# Patient Record
Sex: FEMALE | Race: WHITE | NOT HISPANIC OR LATINO | Employment: UNEMPLOYED | URBAN - METROPOLITAN AREA
[De-identification: names, ages, dates, MRNs, and addresses within clinical notes are randomized per-mention and may not be internally consistent; named-entity substitution may affect disease eponyms.]

---

## 2021-08-19 ENCOUNTER — TELEPHONE (OUTPATIENT)
Dept: OBSTETRICS AND GYNECOLOGY | Facility: CLINIC | Age: 61
End: 2021-08-19

## 2021-11-17 ENCOUNTER — LAB VISIT (OUTPATIENT)
Dept: LAB | Facility: HOSPITAL | Age: 61
End: 2021-11-17
Attending: NURSE PRACTITIONER
Payer: MEDICAID

## 2021-11-17 ENCOUNTER — OFFICE VISIT (OUTPATIENT)
Dept: DIABETES | Facility: CLINIC | Age: 61
End: 2021-11-17
Payer: MEDICAID

## 2021-11-17 VITALS
HEIGHT: 67 IN | WEIGHT: 186.75 LBS | BODY MASS INDEX: 29.31 KG/M2 | DIASTOLIC BLOOD PRESSURE: 79 MMHG | HEART RATE: 79 BPM | SYSTOLIC BLOOD PRESSURE: 146 MMHG

## 2021-11-17 DIAGNOSIS — E03.8 HYPOTHYROIDISM DUE TO HASHIMOTO'S THYROIDITIS: ICD-10-CM

## 2021-11-17 DIAGNOSIS — E11.649 CONTROLLED TYPE 2 DIABETES MELLITUS WITH HYPOGLYCEMIA, WITH LONG-TERM CURRENT USE OF INSULIN: Primary | ICD-10-CM

## 2021-11-17 DIAGNOSIS — E11.42 DIABETIC PERIPHERAL NEUROPATHY ASSOCIATED WITH TYPE 2 DIABETES MELLITUS: ICD-10-CM

## 2021-11-17 DIAGNOSIS — Z79.4 CONTROLLED TYPE 2 DIABETES MELLITUS WITH HYPOGLYCEMIA, WITH LONG-TERM CURRENT USE OF INSULIN: ICD-10-CM

## 2021-11-17 DIAGNOSIS — E06.3 HYPOTHYROIDISM DUE TO HASHIMOTO'S THYROIDITIS: ICD-10-CM

## 2021-11-17 DIAGNOSIS — Z79.4 CONTROLLED TYPE 2 DIABETES MELLITUS WITH HYPOGLYCEMIA, WITH LONG-TERM CURRENT USE OF INSULIN: Primary | ICD-10-CM

## 2021-11-17 DIAGNOSIS — E11.649 CONTROLLED TYPE 2 DIABETES MELLITUS WITH HYPOGLYCEMIA, WITH LONG-TERM CURRENT USE OF INSULIN: ICD-10-CM

## 2021-11-17 LAB
ANION GAP SERPL CALC-SCNC: 8 MMOL/L (ref 8–16)
BUN SERPL-MCNC: 10 MG/DL (ref 8–23)
C PEPTIDE SERPL-MCNC: 0.37 NG/ML (ref 0.78–5.19)
CALCIUM SERPL-MCNC: 9.4 MG/DL (ref 8.7–10.5)
CHLORIDE SERPL-SCNC: 104 MMOL/L (ref 95–110)
CO2 SERPL-SCNC: 28 MMOL/L (ref 23–29)
CREAT SERPL-MCNC: 0.7 MG/DL (ref 0.5–1.4)
EST. GFR  (AFRICAN AMERICAN): >60 ML/MIN/1.73 M^2
EST. GFR  (NON AFRICAN AMERICAN): >60 ML/MIN/1.73 M^2
GLUCOSE SERPL-MCNC: 74 MG/DL (ref 70–110)
GLUCOSE SERPL-MCNC: 81 MG/DL (ref 70–110)
POTASSIUM SERPL-SCNC: 4.1 MMOL/L (ref 3.5–5.1)
SODIUM SERPL-SCNC: 140 MMOL/L (ref 136–145)

## 2021-11-17 PROCEDURE — 82962 GLUCOSE BLOOD TEST: CPT | Mod: PBBFAC | Performed by: NURSE PRACTITIONER

## 2021-11-17 PROCEDURE — 86341 ISLET CELL ANTIBODY: CPT | Performed by: NURSE PRACTITIONER

## 2021-11-17 PROCEDURE — 86337 INSULIN ANTIBODIES: CPT | Performed by: NURSE PRACTITIONER

## 2021-11-17 PROCEDURE — 99999 PR PBB SHADOW E&M-EST. PATIENT-LVL V: CPT | Mod: PBBFAC,,, | Performed by: NURSE PRACTITIONER

## 2021-11-17 PROCEDURE — 99999 PR PBB SHADOW E&M-EST. PATIENT-LVL V: ICD-10-PCS | Mod: PBBFAC,,, | Performed by: NURSE PRACTITIONER

## 2021-11-17 PROCEDURE — 99214 PR OFFICE/OUTPT VISIT, EST, LEVL IV, 30-39 MIN: ICD-10-PCS | Mod: S$PBB,,, | Performed by: NURSE PRACTITIONER

## 2021-11-17 PROCEDURE — 99215 OFFICE O/P EST HI 40 MIN: CPT | Mod: PBBFAC | Performed by: NURSE PRACTITIONER

## 2021-11-17 PROCEDURE — 99214 OFFICE O/P EST MOD 30 MIN: CPT | Mod: S$PBB,,, | Performed by: NURSE PRACTITIONER

## 2021-11-17 PROCEDURE — 84681 ASSAY OF C-PEPTIDE: CPT | Performed by: NURSE PRACTITIONER

## 2021-11-17 PROCEDURE — 36415 COLL VENOUS BLD VENIPUNCTURE: CPT | Performed by: NURSE PRACTITIONER

## 2021-11-17 PROCEDURE — 80048 BASIC METABOLIC PNL TOTAL CA: CPT | Performed by: NURSE PRACTITIONER

## 2021-11-17 RX ORDER — VIT C/E/ZN/COPPR/LUTEIN/ZEAXAN 250MG-90MG
2000 CAPSULE ORAL
COMMUNITY

## 2021-11-17 RX ORDER — PRASUGREL 10 MG/1
TABLET, FILM COATED ORAL
COMMUNITY

## 2021-11-17 RX ORDER — DIAZEPAM 5 MG/1
TABLET ORAL
COMMUNITY
Start: 2021-08-23

## 2021-11-17 RX ORDER — METFORMIN HYDROCHLORIDE 1000 MG/1
1 TABLET ORAL 2 TIMES DAILY
COMMUNITY
Start: 2021-01-25 | End: 2021-11-17 | Stop reason: ALTCHOICE

## 2021-11-17 RX ORDER — ISOSORBIDE MONONITRATE 30 MG/1
30 TABLET, EXTENDED RELEASE ORAL DAILY
Status: ON HOLD | COMMUNITY
Start: 2021-10-11 | End: 2023-08-23 | Stop reason: HOSPADM

## 2021-11-17 RX ORDER — DULAGLUTIDE 1.5 MG/.5ML
INJECTION, SOLUTION SUBCUTANEOUS
COMMUNITY
End: 2021-11-17 | Stop reason: ALTCHOICE

## 2021-11-17 RX ORDER — ROSUVASTATIN CALCIUM 10 MG/1
TABLET, COATED ORAL
COMMUNITY
End: 2022-01-24 | Stop reason: DRUGHIGH

## 2021-11-17 RX ORDER — TRAMADOL HYDROCHLORIDE 50 MG/1
50 TABLET ORAL 2 TIMES DAILY PRN
COMMUNITY
Start: 2021-09-10

## 2021-11-17 RX ORDER — INSULIN GLARGINE 100 [IU]/ML
48 INJECTION, SOLUTION SUBCUTANEOUS DAILY
Qty: 15 ML | Refills: 5 | Status: SHIPPED | OUTPATIENT
Start: 2021-11-17 | End: 2022-08-19 | Stop reason: SDUPTHER

## 2021-11-17 RX ORDER — ASPIRIN 81 MG/1
TABLET ORAL
COMMUNITY

## 2021-11-17 RX ORDER — OMEPRAZOLE 20 MG/1
CAPSULE, DELAYED RELEASE ORAL
COMMUNITY
Start: 2021-10-11

## 2021-11-17 RX ORDER — ESTRADIOL 2 MG/1
TABLET ORAL
COMMUNITY
End: 2022-01-24 | Stop reason: DRUGHIGH

## 2021-11-17 RX ORDER — EMPAGLIFLOZIN 25 MG/1
25 TABLET, FILM COATED ORAL DAILY
Qty: 30 TABLET | Refills: 5 | Status: SHIPPED | OUTPATIENT
Start: 2021-11-17 | End: 2021-11-26 | Stop reason: CLARIF

## 2021-11-17 RX ORDER — PREGABALIN 50 MG/1
50 CAPSULE ORAL 3 TIMES DAILY
COMMUNITY
Start: 2021-10-28 | End: 2022-01-24 | Stop reason: DRUGHIGH

## 2021-11-17 RX ORDER — INSULIN DEGLUDEC 200 U/ML
INJECTION, SOLUTION SUBCUTANEOUS
COMMUNITY
Start: 2021-09-07 | End: 2021-11-17 | Stop reason: CLARIF

## 2021-11-17 RX ORDER — RANOLAZINE 500 MG/1
TABLET, EXTENDED RELEASE ORAL
Status: ON HOLD | COMMUNITY
End: 2023-08-23 | Stop reason: HOSPADM

## 2021-11-17 RX ORDER — ICOSAPENT ETHYL 1000 MG/1
2 CAPSULE ORAL
COMMUNITY

## 2021-11-17 RX ORDER — TIZANIDINE 4 MG/1
4 TABLET ORAL 3 TIMES DAILY PRN
Status: ON HOLD | COMMUNITY
Start: 2021-09-29 | End: 2023-08-23 | Stop reason: HOSPADM

## 2021-11-17 RX ORDER — EMPAGLIFLOZIN 25 MG/1
25 TABLET, FILM COATED ORAL DAILY
COMMUNITY
Start: 2021-11-12 | End: 2021-11-17 | Stop reason: SDUPTHER

## 2021-11-17 RX ORDER — INSULIN ASPART 100 [IU]/ML
5 INJECTION, SOLUTION INTRAVENOUS; SUBCUTANEOUS
Qty: 15 ML | Refills: 5 | Status: SHIPPED | OUTPATIENT
Start: 2021-11-17 | End: 2023-01-25 | Stop reason: SDUPTHER

## 2021-11-17 RX ORDER — LOSARTAN POTASSIUM 100 MG/1
TABLET ORAL
COMMUNITY

## 2021-11-17 RX ORDER — INSULIN ASPART INJECTION 100 [IU]/ML
5 INJECTION, SOLUTION SUBCUTANEOUS
COMMUNITY
Start: 2021-07-28 | End: 2021-11-17 | Stop reason: ALTCHOICE

## 2021-11-17 RX ORDER — HYDROCHLOROTHIAZIDE 12.5 MG/1
12.5 CAPSULE ORAL DAILY
COMMUNITY
Start: 2021-11-12

## 2021-11-17 RX ORDER — METOPROLOL TARTRATE 25 MG/1
TABLET, FILM COATED ORAL
COMMUNITY
Start: 2021-01-11

## 2021-11-17 RX ORDER — LANOLIN ALCOHOL/MO/W.PET/CERES
1000 CREAM (GRAM) TOPICAL
COMMUNITY

## 2021-11-17 RX ORDER — LEVOTHYROXINE SODIUM 125 UG/1
0.5 TABLET ORAL EVERY MORNING
COMMUNITY
Start: 2021-11-03

## 2021-11-21 LAB — GAD65 AB SER-SCNC: 0 NMOL/L

## 2021-11-23 ENCOUNTER — TELEPHONE (OUTPATIENT)
Dept: DIABETES | Facility: CLINIC | Age: 61
End: 2021-11-23
Payer: MEDICAID

## 2021-11-23 DIAGNOSIS — Z79.4 CONTROLLED TYPE 2 DIABETES MELLITUS WITH HYPOGLYCEMIA, WITH LONG-TERM CURRENT USE OF INSULIN: Primary | ICD-10-CM

## 2021-11-23 DIAGNOSIS — E11.649 CONTROLLED TYPE 2 DIABETES MELLITUS WITH HYPOGLYCEMIA, WITH LONG-TERM CURRENT USE OF INSULIN: Primary | ICD-10-CM

## 2021-11-26 ENCOUNTER — TELEPHONE (OUTPATIENT)
Dept: DIABETES | Facility: CLINIC | Age: 61
End: 2021-11-26
Payer: MEDICAID

## 2021-11-26 DIAGNOSIS — Z79.4 CONTROLLED TYPE 2 DIABETES MELLITUS WITH HYPOGLYCEMIA, WITH LONG-TERM CURRENT USE OF INSULIN: Primary | ICD-10-CM

## 2021-11-26 DIAGNOSIS — E11.649 CONTROLLED TYPE 2 DIABETES MELLITUS WITH HYPOGLYCEMIA, WITH LONG-TERM CURRENT USE OF INSULIN: Primary | ICD-10-CM

## 2021-11-26 RX ORDER — DAPAGLIFLOZIN 10 MG/1
10 TABLET, FILM COATED ORAL DAILY
Qty: 30 TABLET | Refills: 5 | Status: SHIPPED | OUTPATIENT
Start: 2021-11-26 | End: 2021-11-30 | Stop reason: CLARIF

## 2021-11-30 DIAGNOSIS — E11.649 CONTROLLED TYPE 2 DIABETES MELLITUS WITH HYPOGLYCEMIA, WITH LONG-TERM CURRENT USE OF INSULIN: Primary | ICD-10-CM

## 2021-11-30 DIAGNOSIS — Z79.4 CONTROLLED TYPE 2 DIABETES MELLITUS WITH HYPOGLYCEMIA, WITH LONG-TERM CURRENT USE OF INSULIN: Primary | ICD-10-CM

## 2021-11-30 RX ORDER — EMPAGLIFLOZIN 10 MG/1
10 TABLET, FILM COATED ORAL DAILY
Qty: 30 TABLET | Refills: 5 | Status: SHIPPED | OUTPATIENT
Start: 2021-11-30 | End: 2022-06-21 | Stop reason: SDUPTHER

## 2021-12-02 ENCOUNTER — TELEPHONE (OUTPATIENT)
Dept: DIABETES | Facility: CLINIC | Age: 61
End: 2021-12-02
Payer: MEDICAID

## 2021-12-16 ENCOUNTER — CLINICAL SUPPORT (OUTPATIENT)
Dept: DIABETES | Facility: CLINIC | Age: 61
End: 2021-12-16
Payer: MEDICAID

## 2021-12-16 ENCOUNTER — TELEPHONE (OUTPATIENT)
Dept: DIABETES | Facility: CLINIC | Age: 61
End: 2021-12-16
Payer: MEDICAID

## 2021-12-16 DIAGNOSIS — Z79.4 CONTROLLED TYPE 2 DIABETES MELLITUS WITH HYPOGLYCEMIA, WITH LONG-TERM CURRENT USE OF INSULIN: Primary | ICD-10-CM

## 2021-12-16 DIAGNOSIS — E11.649 CONTROLLED TYPE 2 DIABETES MELLITUS WITH HYPOGLYCEMIA, WITH LONG-TERM CURRENT USE OF INSULIN: Primary | ICD-10-CM

## 2021-12-16 PROCEDURE — G0108 DIAB MANAGE TRN  PER INDIV: HCPCS | Mod: PBBFAC | Performed by: DIETITIAN, REGISTERED

## 2021-12-17 ENCOUNTER — TELEPHONE (OUTPATIENT)
Dept: DIABETES | Facility: CLINIC | Age: 61
End: 2021-12-17
Payer: MEDICAID

## 2021-12-27 ENCOUNTER — TELEPHONE (OUTPATIENT)
Dept: DIABETES | Facility: CLINIC | Age: 61
End: 2021-12-27
Payer: MEDICAID

## 2022-01-03 DIAGNOSIS — U07.1 COVID-19 VIRUS DETECTED: ICD-10-CM

## 2022-01-19 ENCOUNTER — TELEPHONE (OUTPATIENT)
Dept: DIABETES | Facility: CLINIC | Age: 62
End: 2022-01-19
Payer: MEDICAID

## 2022-01-19 NOTE — TELEPHONE ENCOUNTER
Changed appointment from 8:30 to 9:30  ----- Message from Evon Ruiz RD, CDE sent at 1/19/2022 12:32 PM CST -----  Regarding: FW: reschedule  Contact: pt    ----- Message -----  From: Tiffanie Gary  Sent: 1/19/2022  12:31 PM CST  To: , #  Subject: reschedule                                       Pt states tomorrows appt is too early. Please call pt at [phone]

## 2022-01-24 PROBLEM — Z86.39 HX OF DIABETIC NEUROPATHY: Status: ACTIVE | Noted: 2019-12-12

## 2022-01-27 ENCOUNTER — OFFICE VISIT (OUTPATIENT)
Dept: OBSTETRICS AND GYNECOLOGY | Facility: CLINIC | Age: 62
End: 2022-01-27
Payer: MEDICAID

## 2022-01-27 VITALS
SYSTOLIC BLOOD PRESSURE: 110 MMHG | WEIGHT: 177.13 LBS | DIASTOLIC BLOOD PRESSURE: 72 MMHG | HEIGHT: 67 IN | BODY MASS INDEX: 27.8 KG/M2

## 2022-01-27 DIAGNOSIS — Z12.4 SCREENING FOR CERVICAL CANCER: ICD-10-CM

## 2022-01-27 DIAGNOSIS — N95.1 SYMPTOMATIC MENOPAUSAL OR FEMALE CLIMACTERIC STATES: ICD-10-CM

## 2022-01-27 DIAGNOSIS — Z01.419 ENCOUNTER FOR GYNECOLOGICAL EXAMINATION (GENERAL) (ROUTINE) WITHOUT ABNORMAL FINDINGS: Primary | ICD-10-CM

## 2022-01-27 DIAGNOSIS — Z12.31 SCREENING MAMMOGRAM, ENCOUNTER FOR: ICD-10-CM

## 2022-01-27 PROCEDURE — 99999 PR PBB SHADOW E&M-EST. PATIENT-LVL IV: CPT | Mod: PBBFAC,,, | Performed by: OBSTETRICS & GYNECOLOGY

## 2022-01-27 PROCEDURE — 99999 PR PBB SHADOW E&M-EST. PATIENT-LVL IV: ICD-10-PCS | Mod: PBBFAC,,, | Performed by: OBSTETRICS & GYNECOLOGY

## 2022-01-27 PROCEDURE — 4010F PR ACE/ARB THEARPY RXD/TAKEN: ICD-10-PCS | Mod: CPTII,,, | Performed by: OBSTETRICS & GYNECOLOGY

## 2022-01-27 PROCEDURE — 3078F DIAST BP <80 MM HG: CPT | Mod: CPTII,,, | Performed by: OBSTETRICS & GYNECOLOGY

## 2022-01-27 PROCEDURE — 3074F SYST BP LT 130 MM HG: CPT | Mod: CPTII,,, | Performed by: OBSTETRICS & GYNECOLOGY

## 2022-01-27 PROCEDURE — 1159F MED LIST DOCD IN RCRD: CPT | Mod: CPTII,,, | Performed by: OBSTETRICS & GYNECOLOGY

## 2022-01-27 PROCEDURE — 99386 PREV VISIT NEW AGE 40-64: CPT | Mod: S$PBB,,, | Performed by: OBSTETRICS & GYNECOLOGY

## 2022-01-27 PROCEDURE — 4010F ACE/ARB THERAPY RXD/TAKEN: CPT | Mod: CPTII,,, | Performed by: OBSTETRICS & GYNECOLOGY

## 2022-01-27 PROCEDURE — 3008F BODY MASS INDEX DOCD: CPT | Mod: CPTII,,, | Performed by: OBSTETRICS & GYNECOLOGY

## 2022-01-27 PROCEDURE — 1159F PR MEDICATION LIST DOCUMENTED IN MEDICAL RECORD: ICD-10-PCS | Mod: CPTII,,, | Performed by: OBSTETRICS & GYNECOLOGY

## 2022-01-27 PROCEDURE — 3078F PR MOST RECENT DIASTOLIC BLOOD PRESSURE < 80 MM HG: ICD-10-PCS | Mod: CPTII,,, | Performed by: OBSTETRICS & GYNECOLOGY

## 2022-01-27 PROCEDURE — 3008F PR BODY MASS INDEX (BMI) DOCUMENTED: ICD-10-PCS | Mod: CPTII,,, | Performed by: OBSTETRICS & GYNECOLOGY

## 2022-01-27 PROCEDURE — 99214 OFFICE O/P EST MOD 30 MIN: CPT | Mod: PBBFAC,PN | Performed by: OBSTETRICS & GYNECOLOGY

## 2022-01-27 PROCEDURE — 99386 PR PREVENTIVE VISIT,NEW,40-64: ICD-10-PCS | Mod: S$PBB,,, | Performed by: OBSTETRICS & GYNECOLOGY

## 2022-01-27 PROCEDURE — 3074F PR MOST RECENT SYSTOLIC BLOOD PRESSURE < 130 MM HG: ICD-10-PCS | Mod: CPTII,,, | Performed by: OBSTETRICS & GYNECOLOGY

## 2022-01-27 NOTE — PROGRESS NOTES
Subjective:       Patient ID: Gina Schroeder is a 61 y.o. female.    Chief Complaint:  Well Woman      History of Present Illness  HPI  Annual Exam-Postmenopausal  Patient presents for annual exam. The patient has no complaints today. The patient is sexually active--occas vaginal dryness, using lubricant; using estratest; .=--but does not feel it has helped improve libido GYN screening history: last pap: was normal and patient does not recall when last pap was and last mammogram: approximate date  and was normal. The patient is taking hormone replacement therapy. Patient denies post-menopausal vaginal bleeding. The patient wears seatbelts: yes. The patient participates in regular exercise: no. Has the patient ever been transfused or tattooed?: yes--+tattooes; . The patient reports that there is not domestic violence in her life.    Hot flushes improved with estrogen;   colonscopy current due in 4 yr    Denies urinary leakage   GYN & OB History  No LMP recorded. Patient has had a hysterectomy.   Date of Last Pap: No result found    OB History    Para Term  AB Living   4 2 2   2 2   SAB IAB Ectopic Multiple Live Births   2       2      # Outcome Date GA Lbr Mina/2nd Weight Sex Delivery Anes PTL Lv   4 Term      Vag-Spont   KEITH   3 Term      Vag-Spont   KEITH   2 SAB               Complications: Miscarriage   1 SAB               Complications: Miscarriage       Review of Systems  Review of Systems   Genitourinary: Positive for decreased libido.   Musculoskeletal: Positive for back pain and myalgias.   All other systems reviewed and are negative.          Objective:      Physical Exam:   Constitutional: She is oriented to person, place, and time. She appears well-developed and well-nourished.     Eyes: Pupils are equal, round, and reactive to light. Conjunctivae and EOM are normal.      Pulmonary/Chest: Effort normal. Right breast exhibits no mass, no nipple discharge, no skin change and no tenderness. Left  breast exhibits no mass, no nipple discharge, no skin change and no tenderness. Breasts are symmetrical.        Abdominal: Soft.     Genitourinary:    Vagina, right adnexa, left adnexa and rectum normal.      Pelvic exam was performed with patient supine.   The external female genitalia was normal.   Labial bartholins normal.Right adnexum displays no mass and no tenderness. Left adnexum displays no mass and no tenderness. Vaginal cuff normal.  No erythema,  no vaginal discharge, bleeding, rectocele, cystocele or unspecified prolapse of vaginal walls in the vagina. Vaginal atrophy noted. Cervix is absent.Uterus is absent. Normal urethral meatus.Urethra findings: no urethral massBladder findings: no bladder distention and no bladder tenderness          Musculoskeletal: Normal range of motion and moves all extremeties.       Neurological: She is alert and oriented to person, place, and time.    Skin: Skin is warm.    Psychiatric: She has a normal mood and affect. Her behavior is normal.           Assessment:        1. Encounter for gynecological examination (general) (routine) without abnormal findings    2. Screening mammogram, encounter for    3. Symptomatic menopausal or female climacteric states    4. Screening for cervical cancer               Plan:      Continue annual well woman exam.  Pap not indicated due to hx of nml pap and hx of zoltan  mammo ordered, continue yearly until age 75  Wants to just use premarin as estratest not covered by insurance and she does not feel it is helping; rx sent for premarin    Osteoporosis prevention; 1200mg calcium/d with source of vitamin d  encouraged diet, exercise, weight loss

## 2022-02-03 ENCOUNTER — TELEPHONE (OUTPATIENT)
Dept: DIABETES | Facility: CLINIC | Age: 62
End: 2022-02-03
Payer: MEDICAID

## 2022-02-03 DIAGNOSIS — E06.3 HYPOTHYROIDISM DUE TO HASHIMOTO'S THYROIDITIS: ICD-10-CM

## 2022-02-03 DIAGNOSIS — E11.42 DIABETIC PERIPHERAL NEUROPATHY ASSOCIATED WITH TYPE 2 DIABETES MELLITUS: ICD-10-CM

## 2022-02-03 DIAGNOSIS — E03.8 HYPOTHYROIDISM DUE TO HASHIMOTO'S THYROIDITIS: ICD-10-CM

## 2022-02-03 DIAGNOSIS — E11.649 CONTROLLED TYPE 2 DIABETES MELLITUS WITH HYPOGLYCEMIA, WITH LONG-TERM CURRENT USE OF INSULIN: Primary | ICD-10-CM

## 2022-02-03 DIAGNOSIS — Z79.4 CONTROLLED TYPE 2 DIABETES MELLITUS WITH HYPOGLYCEMIA, WITH LONG-TERM CURRENT USE OF INSULIN: Primary | ICD-10-CM

## 2022-02-03 NOTE — TELEPHONE ENCOUNTER
----- Message from Jadyn Jackson MA sent at 2/3/2022  9:36 AM CST -----  Contact: Gina  Please print labs   ----- Message -----  From: Silverio Blanco  Sent: 2/3/2022   8:47 AM CST  To: [facility] Staff    Patient is calling to speak with the nurse regarding having lab orders placed for upcoming 2/22/22 appointment. Patient is requesting to have labs drawn at the [facility] in [place] instead. Please give patient a call back at [phone] to notify as requested.  Thanks,  RP

## 2022-02-22 ENCOUNTER — OFFICE VISIT (OUTPATIENT)
Dept: DIABETES | Facility: CLINIC | Age: 62
End: 2022-02-22
Payer: MEDICAID

## 2022-02-22 ENCOUNTER — HOSPITAL ENCOUNTER (OUTPATIENT)
Dept: RADIOLOGY | Facility: HOSPITAL | Age: 62
Discharge: HOME | End: 2022-02-22
Attending: OBSTETRICS & GYNECOLOGY
Payer: MEDICAID

## 2022-02-22 VITALS
SYSTOLIC BLOOD PRESSURE: 119 MMHG | HEART RATE: 60 BPM | HEIGHT: 67 IN | DIASTOLIC BLOOD PRESSURE: 70 MMHG | BODY MASS INDEX: 27.16 KG/M2 | WEIGHT: 173.06 LBS

## 2022-02-22 DIAGNOSIS — E11.649 CONTROLLED TYPE 2 DIABETES MELLITUS WITH HYPOGLYCEMIA, WITH LONG-TERM CURRENT USE OF INSULIN: Primary | ICD-10-CM

## 2022-02-22 DIAGNOSIS — Z79.4 CONTROLLED TYPE 2 DIABETES MELLITUS WITH HYPOGLYCEMIA, WITH LONG-TERM CURRENT USE OF INSULIN: Primary | ICD-10-CM

## 2022-02-22 DIAGNOSIS — Z12.31 SCREENING MAMMOGRAM, ENCOUNTER FOR: ICD-10-CM

## 2022-02-22 DIAGNOSIS — E06.3 HYPOTHYROIDISM DUE TO HASHIMOTO'S THYROIDITIS: ICD-10-CM

## 2022-02-22 DIAGNOSIS — E03.8 HYPOTHYROIDISM DUE TO HASHIMOTO'S THYROIDITIS: ICD-10-CM

## 2022-02-22 DIAGNOSIS — E11.42 DIABETIC PERIPHERAL NEUROPATHY ASSOCIATED WITH TYPE 2 DIABETES MELLITUS: ICD-10-CM

## 2022-02-22 LAB — GLUCOSE SERPL-MCNC: 132 MG/DL (ref 70–110)

## 2022-02-22 PROCEDURE — 95251 CONT GLUC MNTR ANALYSIS I&R: CPT | Mod: ,,, | Performed by: NURSE PRACTITIONER

## 2022-02-22 PROCEDURE — 99214 PR OFFICE/OUTPT VISIT, EST, LEVL IV, 30-39 MIN: ICD-10-PCS | Mod: S$PBB,,, | Performed by: NURSE PRACTITIONER

## 2022-02-22 PROCEDURE — 95251 PR GLUCOSE MONITOR, 72 HOUR, PHYS INTERP: ICD-10-PCS | Mod: ,,, | Performed by: NURSE PRACTITIONER

## 2022-02-22 PROCEDURE — 77067 SCR MAMMO BI INCL CAD: CPT | Mod: 26,,, | Performed by: RADIOLOGY

## 2022-02-22 PROCEDURE — 3078F DIAST BP <80 MM HG: CPT | Mod: CPTII,,, | Performed by: NURSE PRACTITIONER

## 2022-02-22 PROCEDURE — 77063 MAMMO DIGITAL SCREENING BILAT WITH TOMO: ICD-10-PCS | Mod: 26,,, | Performed by: RADIOLOGY

## 2022-02-22 PROCEDURE — 77067 SCR MAMMO BI INCL CAD: CPT | Mod: TC

## 2022-02-22 PROCEDURE — 3074F PR MOST RECENT SYSTOLIC BLOOD PRESSURE < 130 MM HG: ICD-10-PCS | Mod: CPTII,,, | Performed by: NURSE PRACTITIONER

## 2022-02-22 PROCEDURE — 4010F ACE/ARB THERAPY RXD/TAKEN: CPT | Mod: CPTII,,, | Performed by: NURSE PRACTITIONER

## 2022-02-22 PROCEDURE — 1159F MED LIST DOCD IN RCRD: CPT | Mod: CPTII,,, | Performed by: NURSE PRACTITIONER

## 2022-02-22 PROCEDURE — 99999 PR PBB SHADOW E&M-EST. PATIENT-LVL V: CPT | Mod: PBBFAC,,, | Performed by: NURSE PRACTITIONER

## 2022-02-22 PROCEDURE — 99999 PR PBB SHADOW E&M-EST. PATIENT-LVL V: ICD-10-PCS | Mod: PBBFAC,,, | Performed by: NURSE PRACTITIONER

## 2022-02-22 PROCEDURE — 3008F PR BODY MASS INDEX (BMI) DOCUMENTED: ICD-10-PCS | Mod: CPTII,,, | Performed by: NURSE PRACTITIONER

## 2022-02-22 PROCEDURE — 3078F PR MOST RECENT DIASTOLIC BLOOD PRESSURE < 80 MM HG: ICD-10-PCS | Mod: CPTII,,, | Performed by: NURSE PRACTITIONER

## 2022-02-22 PROCEDURE — 77067 MAMMO DIGITAL SCREENING BILAT WITH TOMO: ICD-10-PCS | Mod: 26,,, | Performed by: RADIOLOGY

## 2022-02-22 PROCEDURE — 1159F PR MEDICATION LIST DOCUMENTED IN MEDICAL RECORD: ICD-10-PCS | Mod: CPTII,,, | Performed by: NURSE PRACTITIONER

## 2022-02-22 PROCEDURE — 82962 GLUCOSE BLOOD TEST: CPT | Mod: PBBFAC | Performed by: NURSE PRACTITIONER

## 2022-02-22 PROCEDURE — 3074F SYST BP LT 130 MM HG: CPT | Mod: CPTII,,, | Performed by: NURSE PRACTITIONER

## 2022-02-22 PROCEDURE — 77063 BREAST TOMOSYNTHESIS BI: CPT | Mod: 26,,, | Performed by: RADIOLOGY

## 2022-02-22 PROCEDURE — 99214 OFFICE O/P EST MOD 30 MIN: CPT | Mod: S$PBB,,, | Performed by: NURSE PRACTITIONER

## 2022-02-22 PROCEDURE — 99215 OFFICE O/P EST HI 40 MIN: CPT | Mod: PBBFAC | Performed by: NURSE PRACTITIONER

## 2022-02-22 PROCEDURE — 77063 BREAST TOMOSYNTHESIS BI: CPT | Mod: TC

## 2022-02-22 PROCEDURE — 4010F PR ACE/ARB THEARPY RXD/TAKEN: ICD-10-PCS | Mod: CPTII,,, | Performed by: NURSE PRACTITIONER

## 2022-02-22 PROCEDURE — 3008F BODY MASS INDEX DOCD: CPT | Mod: CPTII,,, | Performed by: NURSE PRACTITIONER

## 2022-02-22 NOTE — PROGRESS NOTES
Subjective:         Patient ID: Gina Schroeder is a 61 y.o. female.  Patient's current PCP is Duke Johnson MD.     Chief Complaint: Diabetes Mellitus    HPI  Gina Schroeder is a 61 y.o. White female presenting for a follow up for diabetes. Patient has been diagnosed with type 2 diabetes since 2010 .    CURRENT DM MEDICATIONS:   Lantus 48 units daily  Novolog TID AC per SS  160-200: +1 unit  201-240: +2 units  241-280: +3 units  281-320: +4 units  321-360: +5 units  Jardiance 25 mg daily  Metformin 1 gram BID    Past failed treatment include:  DPP4's and GLP1's contraindicated due to h/o pancreatitis. V-go caused a skin reaction.  Tresiba and Fiasp switched to current insulins due to formulary with insurance switch.    Blood glucose testing continuous per Dexcom  Preferred lab: [facility]    Any episodes of hypoglycemia? 2%  Mild per Dexcom    Complications related to diabetes: peripheral neuropathy and cardiovascular disease    Her blood sugar in the clinic today was:   Lab Results   Component Value Date    POCGLU 132 (A) 02/22/2022     Gina Schroeder presents today for follow up visit to discuss diabetes management.  Between visits, she states she was trained briefly on Dexcom and needs further education-having issues with sensors. For the last 2 weeks, average glucose of 138 mg/dL. She is 83% in range, 13% high, and 2% of readings are >250. 2% mild hypoglycemia with nothing significant. On her best day, Feb. 15, she was 100% in range. She has a standard deviation of 44 mg/dL with an estimated GMI of 6.6%. She did have a steroid injection with noted elevated readings the past few days. Prior to this, glycemic control much more stable. Based on review of this download, nothing consistent enough to warrant an adjustment. Her insulin doses will be continued as current. She did return to Metformin (had a trial off between visits) as her glycemic control worsened when off.She is tolerating Jardiance well from a  perspective.  She is due for lab work.    Peripheral neuropathy- Better on Lyrica, previously on Gabapentin that did not control and also caused significant fatigue.     Hypothyroidism-she takes Synthroid 125 g daily. Thyroid function studies are normal.      Hyperlipidemia- On Crestor and Vascepa. Low HDL of 25 however other aspects are normal.     CAD- H/o cardiac stents placed. Followed by cardiology routinely.     Lab Results   Component Value Date    HGBA1C 6.9 (H) 10/20/2021    HGBA1C 7.4 (H) 07/15/2021    HGBA1C 7.2 (H) 10/08/2020       STANDARDS OF CARE  Diabetes Management Status    Statin: Taking  ACE/ARB: Taking    Screening or Prevention Patient's value Goal Complete/Controlled?   HgA1C Testing and Control   Lab Results   Component Value Date    HGBA1C 6.9 (H) 10/20/2021      Annually/Less than 8% Yes   Lipid profile : 10/20/2021 Annually No   LDL control No results found for: LDLCALC Annually/Less than 100 mg/dl  No   Nephropathy screening No results found for: LABMICR  No results found for: PROTEINUA  No results found for: UTPCR   Annually No   Blood pressure BP Readings from Last 1 Encounters:   02/22/22 119/70    Less than 140/90 Yes   Dilated retinal exam Most Recent Eye Exam Date: Not Found Annually No   Foot exam   : 01/26/2021 Annually No -Deferred       Labs reviewed and are noted below.                  Lab Results   Component Value Date     11/17/2021    K 4.1 11/17/2021     11/17/2021    ANIONGAP 8 11/17/2021    CREATININE 0.7 11/17/2021    ESTGFRAFRICA >60.0 11/17/2021    EGFRNONAA >60.0 11/17/2021    BUN 10 11/17/2021    CO2 28 11/17/2021    GLU 74 11/17/2021     Lab Results   Component Value Date    GLUTAMICACID 0.00 11/17/2021    CPEPTIDE 0.37 (L) 11/17/2021    CALCIUM 9.4 11/17/2021     Lab Results   Component Value Date    CPEPTIDE 0.37 (L) 11/17/2021     Lab Results   Component Value Date    GLUTAMICACID 0.00 11/17/2021     Glucose   Date Value Ref Range Status   11/17/2021 74 70 - 110  mg/dL Final     Anion Gap   Date Value Ref Range Status   11/17/2021 8 8 - 16 mmol/L Final     eGFR if    Date Value Ref Range Status   11/17/2021 >60.0 >60 mL/min/1.73 m^2 Final     eGFR if non    Date Value Ref Range Status   11/17/2021 >60.0 >60 mL/min/1.73 m^2 Final     Comment:     Calculation used to obtain the estimated glomerular filtration  rate (eGFR) is the CKD-EPI equation.          The following portions of the patient's history were reviewed and updated as appropriate: allergies, current medications, past family history, past medical history, past social history, past surgical history and problem list.    Review of patient's allergies indicates:  No Known Allergies  Social History     Socioeconomic History    Marital status:    Tobacco Use    Smoking status: Never Smoker    Smokeless tobacco: Never Used   Substance and Sexual Activity    Alcohol use: Not Currently    Drug use: Not Currently    Sexual activity: Yes     Partners: Male     Past Medical History:   Diagnosis Date    Arthritis     Diabetes mellitus     Heart disease      REVIEW OF SYSTEMS:  Eyes No history of DR.  Cardiovascular: History of cardiac stents placed.  GI: History of pancreatitis  : No  side effects associated with Jardiance.  Neuro: Positive neuropathy.  PSYCH: No tobacco use.  ENDO: See HPI.        Objective:      Vitals:    02/22/22 1303   BP: 119/70   Pulse: 60     RESPIRATORY: No respiratory distress  NEUROLOGIC: Cranial nerves II-XII grossly intact.   PSYCHIATRIC: Alert & oriented x3. Normal mood and affect.  FOOT EXAMINATION: Deferred to next visit.  Assessment:       1. Controlled type 2 diabetes mellitus with hypoglycemia, with long-term current use of insulin    2. Diabetic peripheral neuropathy associated with type 2 diabetes mellitus    3. Hypothyroidism due to Hashimoto's thyroiditis        Plan:   Gina was seen today for diabetes mellitus.    Diagnoses and all  orders for this visit:    Controlled type 2 diabetes mellitus with hypoglycemia, with long-term current use of insulin-Chronic,Stable  -     POCT Glucose, Hand-Held Device  -     Basic Metabolic Panel; Future  -     Comprehensive Metabolic Panel; Future  -     Lipid Panel; Future  -     Microalbumin/Creatinine Ratio, Urine; Future  -     TSH; Future  -     T4, Free; Future  -     Hemoglobin A1C; Future  -     Comprehensive Metabolic Panel  -     Lipid Panel  -     TSH  -     T4, Free    Medications adjusted for today's visit include:    Continue Metformin twice a day.    Continue Jardiance once daily.    Continue Lantus 44 units every morning.    Continue Novolog per sliding scale:    160-200: +1 unit  201-240: +2 units  241-280: +3 units  281-320: +4 units  321-360: +5 units    Continuous glucose monitoring report:    The patient's CGM was downloaded and was reviewed for the last 14 days.      For the last 2 weeks, average glucose of 138 mg/dL. She is 83% in range, 13% high, and 2% of readings are >250. 2% mild hypoglycemia with nothing significant. On her best day, Feb. 15, she was 100% in range. She has a standard deviation of 44 mg/dL with an estimated GMI of 6.6%. She did have a steroid injection with noted elevated readings the past few days. Prior to this, glycemic control much more stable. Based on review of this download, nothing consistent enough to warrant an adjustment. Her insulin doses will be continued as current.    Diabetic peripheral neuropathy associated with type 2 diabetes mellitus-Chronic,stable    -Continue Lyrica- managed per PCP.    Hypothyroidism due to Hashimoto's thyroiditis-Chronic,stable  -     TSH; Future  -     T4, Free; Future  -     TSH  -     T4, Free  -     TSH; Future  -     TSH    -Continue Levothyroxine 125 mcg daily.    - Follow up: 3 months    I spent a total of 35 minutes on the day of the visit.This includes face to face time and non-face to face time preparing to see the  "patient (eg, review of tests), documenting clinical information in the electronic record, independently interpreting results and communicating results to the patient.    Portions of this note may have been created with voice recognition software. Occasional "wrong-word" or "sound-a-like" substitutions may have occurred due to the inherent limitations of voice recognition software. Please, read the note carefully and recognize, using context, where substitutions have occurred.         "

## 2022-03-08 ENCOUNTER — TELEPHONE (OUTPATIENT)
Dept: OBSTETRICS AND GYNECOLOGY | Facility: CLINIC | Age: 62
End: 2022-03-08
Payer: MEDICAID

## 2022-03-08 NOTE — TELEPHONE ENCOUNTER
----- Message from Cristina Roche sent at 3/8/2022  8:43 AM CST -----  Contact: [phone] @ Patient  Good morning  Patient said she is still tired and would like her Rx estrogens, conjugated, (PREMARIN) 1.25 MG tablet increase or could Dr get medicaid to approve the previous Rx. Patient also has headaches    Please call and advise

## 2022-03-09 ENCOUNTER — TELEPHONE (OUTPATIENT)
Dept: OBSTETRICS AND GYNECOLOGY | Facility: CLINIC | Age: 62
End: 2022-03-09
Payer: MEDICAID

## 2022-03-09 NOTE — TELEPHONE ENCOUNTER
Lory Suarez MD         4:31 PM  Note  Premarin 1.25 is the highest dose     May need to discuss her worsening  fatigue with her pcp        Called patient and she is feeling better and realized Dexcom meter was giving false readings and was taking too much insulin.  Patient will continue with Premarin as prescribed and call for appointment if needed.

## 2022-03-09 NOTE — TELEPHONE ENCOUNTER
----- Message from Cristina Roche sent at 3/9/2022  3:26 PM CST -----  Contact: Patient @ [phone]  Patient is returning a phone call.  Who left a message for the patient: Stephenie Scott LPN  Does patient know what this is regarding:    Would you like a call back, or a response through your [facility] portal?:  call   Comments:

## 2022-03-22 PROBLEM — M47.27 OTHER SPONDYLOSIS WITH RADICULOPATHY, LUMBOSACRAL REGION: Status: ACTIVE | Noted: 2022-02-10

## 2022-05-12 ENCOUNTER — TELEPHONE (OUTPATIENT)
Dept: DIABETES | Facility: CLINIC | Age: 62
End: 2022-05-12
Payer: MEDICAID

## 2022-06-13 ENCOUNTER — TELEPHONE (OUTPATIENT)
Dept: DIABETES | Facility: CLINIC | Age: 62
End: 2022-06-13
Payer: MEDICAID

## 2022-06-13 NOTE — TELEPHONE ENCOUNTER
----- Message from Tiffanie Gary sent at 6/13/2022  9:25 AM CDT -----  Regarding: appt request  Contact: pt  Pt calling to reschedule an appt she had to cancel. [phone]

## 2022-06-21 DIAGNOSIS — Z79.4 CONTROLLED TYPE 2 DIABETES MELLITUS WITH HYPOGLYCEMIA, WITH LONG-TERM CURRENT USE OF INSULIN: ICD-10-CM

## 2022-06-21 DIAGNOSIS — E11.649 CONTROLLED TYPE 2 DIABETES MELLITUS WITH HYPOGLYCEMIA, WITH LONG-TERM CURRENT USE OF INSULIN: ICD-10-CM

## 2022-06-21 RX ORDER — EMPAGLIFLOZIN 10 MG/1
10 TABLET, FILM COATED ORAL DAILY
Qty: 30 TABLET | Refills: 11 | Status: SHIPPED | OUTPATIENT
Start: 2022-06-21 | End: 2023-01-12 | Stop reason: SDUPTHER

## 2022-06-29 ENCOUNTER — OFFICE VISIT (OUTPATIENT)
Dept: DIABETES | Facility: CLINIC | Age: 62
End: 2022-06-29
Payer: MEDICAID

## 2022-06-29 VITALS
TEMPERATURE: 98 F | SYSTOLIC BLOOD PRESSURE: 134 MMHG | DIASTOLIC BLOOD PRESSURE: 76 MMHG | BODY MASS INDEX: 27.34 KG/M2 | WEIGHT: 174.19 LBS | RESPIRATION RATE: 18 BRPM | HEIGHT: 67 IN | HEART RATE: 76 BPM

## 2022-06-29 DIAGNOSIS — E03.8 HYPOTHYROIDISM DUE TO HASHIMOTO'S THYROIDITIS: ICD-10-CM

## 2022-06-29 DIAGNOSIS — E11.649 CONTROLLED TYPE 2 DIABETES MELLITUS WITH HYPOGLYCEMIA, WITH LONG-TERM CURRENT USE OF INSULIN: Primary | ICD-10-CM

## 2022-06-29 DIAGNOSIS — E11.42 DIABETIC PERIPHERAL NEUROPATHY ASSOCIATED WITH TYPE 2 DIABETES MELLITUS: ICD-10-CM

## 2022-06-29 DIAGNOSIS — E06.3 HYPOTHYROIDISM DUE TO HASHIMOTO'S THYROIDITIS: ICD-10-CM

## 2022-06-29 DIAGNOSIS — Z79.4 CONTROLLED TYPE 2 DIABETES MELLITUS WITH HYPOGLYCEMIA, WITH LONG-TERM CURRENT USE OF INSULIN: Primary | ICD-10-CM

## 2022-06-29 LAB — GLUCOSE SERPL-MCNC: 119 MG/DL (ref 70–110)

## 2022-06-29 PROCEDURE — 95251 CONT GLUC MNTR ANALYSIS I&R: CPT | Mod: ,,, | Performed by: NURSE PRACTITIONER

## 2022-06-29 PROCEDURE — 3078F PR MOST RECENT DIASTOLIC BLOOD PRESSURE < 80 MM HG: ICD-10-PCS | Mod: CPTII,,, | Performed by: NURSE PRACTITIONER

## 2022-06-29 PROCEDURE — 99999 PR PBB SHADOW E&M-EST. PATIENT-LVL V: CPT | Mod: PBBFAC,,, | Performed by: NURSE PRACTITIONER

## 2022-06-29 PROCEDURE — 3078F DIAST BP <80 MM HG: CPT | Mod: CPTII,,, | Performed by: NURSE PRACTITIONER

## 2022-06-29 PROCEDURE — 1160F RVW MEDS BY RX/DR IN RCRD: CPT | Mod: CPTII,,, | Performed by: NURSE PRACTITIONER

## 2022-06-29 PROCEDURE — 3008F PR BODY MASS INDEX (BMI) DOCUMENTED: ICD-10-PCS | Mod: CPTII,,, | Performed by: NURSE PRACTITIONER

## 2022-06-29 PROCEDURE — 4010F PR ACE/ARB THEARPY RXD/TAKEN: ICD-10-PCS | Mod: CPTII,,, | Performed by: NURSE PRACTITIONER

## 2022-06-29 PROCEDURE — 99215 OFFICE O/P EST HI 40 MIN: CPT | Mod: PBBFAC | Performed by: NURSE PRACTITIONER

## 2022-06-29 PROCEDURE — 3075F SYST BP GE 130 - 139MM HG: CPT | Mod: CPTII,,, | Performed by: NURSE PRACTITIONER

## 2022-06-29 PROCEDURE — 4010F ACE/ARB THERAPY RXD/TAKEN: CPT | Mod: CPTII,,, | Performed by: NURSE PRACTITIONER

## 2022-06-29 PROCEDURE — 99213 OFFICE O/P EST LOW 20 MIN: CPT | Mod: S$PBB,,, | Performed by: NURSE PRACTITIONER

## 2022-06-29 PROCEDURE — 82962 GLUCOSE BLOOD TEST: CPT | Mod: PBBFAC | Performed by: NURSE PRACTITIONER

## 2022-06-29 PROCEDURE — 1160F PR REVIEW ALL MEDS BY PRESCRIBER/CLIN PHARMACIST DOCUMENTED: ICD-10-PCS | Mod: CPTII,,, | Performed by: NURSE PRACTITIONER

## 2022-06-29 PROCEDURE — 3008F BODY MASS INDEX DOCD: CPT | Mod: CPTII,,, | Performed by: NURSE PRACTITIONER

## 2022-06-29 PROCEDURE — 3075F PR MOST RECENT SYSTOLIC BLOOD PRESS GE 130-139MM HG: ICD-10-PCS | Mod: CPTII,,, | Performed by: NURSE PRACTITIONER

## 2022-06-29 PROCEDURE — 95251 PR GLUCOSE MONITOR, 72 HOUR, PHYS INTERP: ICD-10-PCS | Mod: ,,, | Performed by: NURSE PRACTITIONER

## 2022-06-29 PROCEDURE — 99213 PR OFFICE/OUTPT VISIT, EST, LEVL III, 20-29 MIN: ICD-10-PCS | Mod: S$PBB,,, | Performed by: NURSE PRACTITIONER

## 2022-06-29 PROCEDURE — 99999 PR PBB SHADOW E&M-EST. PATIENT-LVL V: ICD-10-PCS | Mod: PBBFAC,,, | Performed by: NURSE PRACTITIONER

## 2022-06-29 PROCEDURE — 1159F MED LIST DOCD IN RCRD: CPT | Mod: CPTII,,, | Performed by: NURSE PRACTITIONER

## 2022-06-29 PROCEDURE — 1159F PR MEDICATION LIST DOCUMENTED IN MEDICAL RECORD: ICD-10-PCS | Mod: CPTII,,, | Performed by: NURSE PRACTITIONER

## 2022-06-29 NOTE — PROGRESS NOTES
Subjective:         Patient ID: Gina Schroeder is a 61 y.o. female.  Patient's current PCP is Duke Johnson MD.     Chief Complaint: General Diabetes Follow-up    HPI  Gina Schroeder is a 61 y.o. White female presenting for a follow up for diabetes. Patient has been diagnosed with type 2 diabetes since 2010 .    CURRENT DM MEDICATIONS:   Lantus 48 units daily  Novolog TID AC per SS  160-200: +1 unit  201-240: +2 units  241-280: +3 units  281-320: +4 units  321-360: +5 units  Jardiance 25 mg daily  Metformin 1 gram BID    Past failed treatment include:  DPP4's and GLP1's contraindicated due to h/o pancreatitis. V-go caused a skin reaction.  Tresiba and Fiasp switched to current insulins due to formulary with insurance switch.    Blood glucose testing continuous per Dexcom  Preferred lab: [facility]     Any episodes of hypoglycemia? 2%  Mild per Dexcom    Complications related to diabetes: peripheral neuropathy and cardiovascular disease    Her blood sugar in the clinic today was:   Lab Results   Component Value Date    POCGLU 119 (A) 06/29/2022     Gina Schroeder presents today for follow up visit to discuss diabetes management. Per Dexcom download, for the last 2 weeks avg glucose of 133. She is 92% in range, 7% mildly elevated, and no readings >250. <1% mild hypoglycemia with <1% significant. Standard deviation of 29 mg/dL noted with an estimated GMI of 6.5%. On her best day, June 27, she was within target range 100% of the day. BGs are very stable with nothing consistent enough to warrant an adjustment. Based on review, plan to continue meds as current. She is tolerating current medications without side effects.     She reports she completed lab work that I ordered however I never received results- will request.    She is in the process of applying for long-term disability.    Peripheral neuropathy- Better on Lyrica, previously on Gabapentin that did not control and also caused significant fatigue.     Hypothyroidism-she  takes Synthroid 125 mg daily. Thyroid function studies are normal.      Hyperlipidemia- On Crestor and Vascepa.    CAD- H/o cardiac stents placed. Followed by cardiology routinely.     Lab Results   Component Value Date    HGBA1C 6.6 (H) 04/07/2022    HGBA1C 6.9 (H) 10/20/2021    HGBA1C 7.4 (H) 07/15/2021     STANDARDS OF CARE  Diabetes Management Status    Statin: Taking  ACE/ARB: Taking    Screening or Prevention Patient's value Goal Complete/Controlled?   HgA1C Testing and Control   Lab Results   Component Value Date    HGBA1C 6.6 (H) 04/07/2022      Annually/Less than 8% Yes   Lipid profile : 10/20/2021 Annually No   LDL control No results found for: LDLCALC Annually/Less than 100 mg/dl  No   Nephropathy screening No results found for: LABMICR  No results found for: PROTEINUA  No results found for: UTPCR   Annually No   Blood pressure BP Readings from Last 1 Encounters:   06/29/22 134/76    Less than 140/90 Yes   Dilated retinal exam : 03/30/2021 Annually No Advised to schedule    Foot exam   : 06/29/2022 Annually Yes      Labs reviewed and are noted below.                  Lab Results   Component Value Date     11/17/2021    K 4.1 11/17/2021     11/17/2021    ANIONGAP 8 11/17/2021    CREATININE 0.7 11/17/2021    ESTGFRAFRICA >60.0 11/17/2021    EGFRNONAA >60.0 11/17/2021    BUN 10 11/17/2021    CO2 28 11/17/2021    GLU 74 11/17/2021     Lab Results   Component Value Date    GLUTAMICACID 0.00 11/17/2021    CPEPTIDE 0.37 (L) 11/17/2021    CALCIUM 9.4 11/17/2021     Lab Results   Component Value Date    CPEPTIDE 0.37 (L) 11/17/2021     Lab Results   Component Value Date    GLUTAMICACID 0.00 11/17/2021     Glucose   Date Value Ref Range Status   11/17/2021 74 70 - 110 mg/dL Final     Anion Gap   Date Value Ref Range Status   11/17/2021 8 8 - 16 mmol/L Final     eGFR if    Date Value Ref Range Status   11/17/2021 >60.0 >60 mL/min/1.73 m^2 Final     eGFR if non    Date  Value Ref Range Status   11/17/2021 >60.0 >60 mL/min/1.73 m^2 Final     Comment:     Calculation used to obtain the estimated glomerular filtration  rate (eGFR) is the CKD-EPI equation.          The following portions of the patient's history were reviewed and updated as appropriate: allergies, current medications, past family history, past medical history, past social history, past surgical history and problem list.    Review of patient's allergies indicates:  No Known Allergies  Social History     Socioeconomic History    Marital status:    Tobacco Use    Smoking status: Never Smoker    Smokeless tobacco: Never Used   Substance and Sexual Activity    Alcohol use: Not Currently    Drug use: Not Currently    Sexual activity: Yes     Partners: Male     Past Medical History:   Diagnosis Date    Arthritis     Diabetes mellitus     Heart disease      REVIEW OF SYSTEMS:  Eyes No history of DR.  Cardiovascular: History of cardiac stents placed.  GI: History of pancreatitis  : No  side effects associated with Jardiance.  Neuro: Positive neuropathy.  PSYCH: No tobacco use.  ENDO: See HPI.        Objective:      Vitals:    06/29/22 1107   BP: 134/76   Pulse: 76   Resp: 18   Temp: 98.1 °F (36.7 °C)     RESPIRATORY: No respiratory distress  NEUROLOGIC: Cranial nerves II-XII grossly intact.   PSYCHIATRIC: Alert & oriented x3. Normal mood and affect.  FOOT EXAMINATION: Protective Sensation (w/ 10 gram monofilament):  Right: Intact  Left: Intact    Visual Inspection:  Normal -  Bilateral and Nails Intact - without Evidence of Foot Deformity- Bilateral    Pedal Pulses:   Right: Present  Left: Present    Assessment:       1. Controlled type 2 diabetes mellitus with hypoglycemia, with long-term current use of insulin    2. Diabetic peripheral neuropathy associated with type 2 diabetes mellitus    3. Hypothyroidism due to Hashimoto's thyroiditis        Plan:   Gina was seen today for general diabetes  "follow-up.    Diagnoses and all orders for this visit:    Controlled type 2 diabetes mellitus with hypoglycemia, with long-term current use of insulin  -     POCT Glucose, Hand-Held Device  -     Comprehensive Metabolic Panel; Future  -     T4, Free; Future  -     TSH; Future  -     Hemoglobin A1C; Future  -     Comprehensive Metabolic Panel  -     T4, Free  -     TSH  -     Hemoglobin A1C    Chronic-    Medications adjusted for today's visit include:    Continue Metformin twice a day.    Continue Jardiance once daily.    Continue Lantus 44 units every morning.    Continue Novolog per sliding scale:    160-200: +1 unit  201-240: +2 units  241-280: +3 units  281-320: +4 units  321-360: +5 units    Continuous glucose monitoring report:    The patient's CGM was downloaded and was reviewed for the last 14 days.     Per Dexcom download, for the last 2 weeks avg glucose of 133. She is 92% in range, 7% mildly elevated, and no readings >250. <1% mild hypoglycemia with <1% significant. Standard deviation of 29 mg/dL noted with an estimated GMI of 6.5%. On her best day, June 27, she was within target range 100% of the day. BGs are very stable with nothing consistent enough to warrant an adjustment. Based on review, plan to continue meds as current.     Diabetic peripheral neuropathy associated with type 2 diabetes mellitus    Chronic- Continue Lyrica. PCP managing.    Hypothyroidism due to Hashimoto's thyroiditis  -     T4, Free; Future  -     TSH; Future  -     T4, Free  -     TSH    Chronic-Continue Levothyroxine 125 mcg daily.    - Follow up: 3 months    Portions of this note may have been created with voice recognition software. Occasional "wrong-word" or "sound-a-like" substitutions may have occurred due to the inherent limitations of voice recognition software. Please, read the note carefully and recognize, using context, where substitutions have occurred.             Sheri Ammons,FNP-C [facility]  "

## 2022-06-29 NOTE — PATIENT INSTRUCTIONS
Labs, fasting in July.    1. Limit carbs to no more than 30 grams with each meal. Never eat carbs by themselves, always add protein. Make snacks low carb or non-carb only.    2. Continue checking blood sugar 4 times daily: Blood sugar goals should be a fasting blood sugar between , and no blood sugars throughout the day over 180 is good, less than 160 better. Keep a log book and bring with you to all appointments along with your glucose meter.    3. Medications adjusted for today's visit include:  Continue Metformin twice a day.    Continue Jardiance once daily.    Continue Lantus 44 units every morning.    Continue Novolog per sliding scale:    160-200: +1 unit  201-240: +2 units  241-280: +3 units  281-320: +4 units  321-360: +5 units    4. Carry glucose tablets with you at all times to treat a low blood sugar episode (less than 70). Chew 4 tablets and re-check blood sugar in 15 minutes. If still low, repeat this. Always call the clinic to give an update for any low blood sugar episodes.    5. Exercise as tolerated.    6. Follow-up for your next visit in 3 months.    7. Please either drop off, fax, or MyChart your readings to me as needed.

## 2022-08-12 ENCOUNTER — TELEPHONE (OUTPATIENT)
Dept: OBSTETRICS AND GYNECOLOGY | Facility: CLINIC | Age: 62
End: 2022-08-12
Payer: MEDICAID

## 2022-08-12 NOTE — TELEPHONE ENCOUNTER
Spoke with pt. Appointment sent out for scheduling for 8/16/22 in [place] for 1130 am , pt voiced understanding.

## 2022-08-12 NOTE — TELEPHONE ENCOUNTER
----- Message from Veronica Barajas sent at 8/12/2022  9:24 AM CDT -----  Contact: self  Patient is requesting a call back in regards to a personal matter if you could please give her a call back at [phone]. Thank you   Jeff

## 2022-08-16 ENCOUNTER — OFFICE VISIT (OUTPATIENT)
Dept: OBSTETRICS AND GYNECOLOGY | Facility: CLINIC | Age: 62
End: 2022-08-16
Payer: MEDICAID

## 2022-08-16 ENCOUNTER — TELEPHONE (OUTPATIENT)
Dept: SPORTS MEDICINE | Facility: CLINIC | Age: 62
End: 2022-08-16
Payer: MEDICAID

## 2022-08-16 VITALS
DIASTOLIC BLOOD PRESSURE: 72 MMHG | WEIGHT: 170.63 LBS | BODY MASS INDEX: 26.78 KG/M2 | HEIGHT: 67 IN | SYSTOLIC BLOOD PRESSURE: 118 MMHG

## 2022-08-16 DIAGNOSIS — M25.511 RIGHT SHOULDER PAIN, UNSPECIFIED CHRONICITY: Primary | ICD-10-CM

## 2022-08-16 DIAGNOSIS — N76.0 ACUTE VAGINITIS: Primary | ICD-10-CM

## 2022-08-16 PROCEDURE — 3078F PR MOST RECENT DIASTOLIC BLOOD PRESSURE < 80 MM HG: ICD-10-PCS | Mod: CPTII,,, | Performed by: NURSE PRACTITIONER

## 2022-08-16 PROCEDURE — 99999 PR PBB SHADOW E&M-EST. PATIENT-LVL IV: CPT | Mod: PBBFAC,,, | Performed by: NURSE PRACTITIONER

## 2022-08-16 PROCEDURE — 3074F SYST BP LT 130 MM HG: CPT | Mod: CPTII,,, | Performed by: NURSE PRACTITIONER

## 2022-08-16 PROCEDURE — 3008F BODY MASS INDEX DOCD: CPT | Mod: CPTII,,, | Performed by: NURSE PRACTITIONER

## 2022-08-16 PROCEDURE — 87481 CANDIDA DNA AMP PROBE: CPT | Mod: 59 | Performed by: NURSE PRACTITIONER

## 2022-08-16 PROCEDURE — 1160F PR REVIEW ALL MEDS BY PRESCRIBER/CLIN PHARMACIST DOCUMENTED: ICD-10-PCS | Mod: CPTII,,, | Performed by: NURSE PRACTITIONER

## 2022-08-16 PROCEDURE — 4010F ACE/ARB THERAPY RXD/TAKEN: CPT | Mod: CPTII,,, | Performed by: NURSE PRACTITIONER

## 2022-08-16 PROCEDURE — 1159F MED LIST DOCD IN RCRD: CPT | Mod: CPTII,,, | Performed by: NURSE PRACTITIONER

## 2022-08-16 PROCEDURE — 4010F PR ACE/ARB THEARPY RXD/TAKEN: ICD-10-PCS | Mod: CPTII,,, | Performed by: NURSE PRACTITIONER

## 2022-08-16 PROCEDURE — 3008F PR BODY MASS INDEX (BMI) DOCUMENTED: ICD-10-PCS | Mod: CPTII,,, | Performed by: NURSE PRACTITIONER

## 2022-08-16 PROCEDURE — 1160F RVW MEDS BY RX/DR IN RCRD: CPT | Mod: CPTII,,, | Performed by: NURSE PRACTITIONER

## 2022-08-16 PROCEDURE — 99214 OFFICE O/P EST MOD 30 MIN: CPT | Mod: PBBFAC,PN | Performed by: NURSE PRACTITIONER

## 2022-08-16 PROCEDURE — 99212 OFFICE O/P EST SF 10 MIN: CPT | Mod: S$PBB,,, | Performed by: NURSE PRACTITIONER

## 2022-08-16 PROCEDURE — 3078F DIAST BP <80 MM HG: CPT | Mod: CPTII,,, | Performed by: NURSE PRACTITIONER

## 2022-08-16 PROCEDURE — 1159F PR MEDICATION LIST DOCUMENTED IN MEDICAL RECORD: ICD-10-PCS | Mod: CPTII,,, | Performed by: NURSE PRACTITIONER

## 2022-08-16 PROCEDURE — 3074F PR MOST RECENT SYSTOLIC BLOOD PRESSURE < 130 MM HG: ICD-10-PCS | Mod: CPTII,,, | Performed by: NURSE PRACTITIONER

## 2022-08-16 PROCEDURE — 99999 PR PBB SHADOW E&M-EST. PATIENT-LVL IV: ICD-10-PCS | Mod: PBBFAC,,, | Performed by: NURSE PRACTITIONER

## 2022-08-16 PROCEDURE — 99212 PR OFFICE/OUTPT VISIT, EST, LEVL II, 10-19 MIN: ICD-10-PCS | Mod: S$PBB,,, | Performed by: NURSE PRACTITIONER

## 2022-08-16 RX ORDER — BUPROPION HYDROCHLORIDE 150 MG/1
150 TABLET ORAL DAILY
COMMUNITY
Start: 2022-08-10

## 2022-08-16 NOTE — PROGRESS NOTES
Subjective:       Patient ID: Gina Schroeder is a 61 y.o. female.    Chief Complaint:  Vaginitis      History of Present Illness  HPI   present for vaginitis  Went to urgent care for possible UTI  Treated with diflucan x2 doses with no relief  currently using monistat 3  some relief, but still feels very irritated  A1C decreased from 10 to 6.2  Same partner x11 years  No sex drive for a while  Both baths and showers; ivory    GYN & OB History  No LMP recorded. Patient has had a hysterectomy.   Date of Last Pap: No result found    OB History    Para Term  AB Living   4 2 2   2 2   SAB IAB Ectopic Multiple Live Births   2       2      # Outcome Date GA Lbr Mina/2nd Weight Sex Delivery Anes PTL Lv   4 Term      Vag-Spont   KEITH   3 Term      Vag-Spont   KEITH   2 SAB               Complications: Miscarriage   1 SAB               Complications: Miscarriage       Review of Systems  Review of Systems   Genitourinary: Negative for vaginal discharge and vaginal odor.        + vaginal irritation   All other systems reviewed and are negative.          Objective:      Physical Exam:   Constitutional: She is oriented to person, place, and time. She appears well-developed and well-nourished. No distress.    HENT:   Head: Normocephalic and atraumatic.    Eyes: Pupils are equal, round, and reactive to light. Conjunctivae and EOM are normal.     Cardiovascular: Normal rate.     Pulmonary/Chest: Effort normal.        Abdominal: Soft. She exhibits no distension. There is no abdominal tenderness. There is no rebound and no guarding. Hernia confirmed negative in the right inguinal area and confirmed negative in the left inguinal area.     Genitourinary:    Inguinal canal normal.   Rectum:      No external hemorrhoid.            Pelvic exam was performed with patient supine.   The external female genitalia was normal.   No external genitalia lesions identified,Genitalia hair distrobution normal .   Labial bartholins  normal.There is no rash, tenderness, lesion or injury on the right labia. There is no rash, tenderness, lesion or injury on the left labia. There is vaginal discharge (moderate amt monistat noted) in the vagina. No erythema, tenderness or bleeding in the vagina.    No foreign body in the vagina.      No signs of injury in the vagina.   Cervix is absent.Uterus is absent. Normal urethral meatus.Urethral Meatus exhibits: urethral lesion and prolapsedUrethra findings: no urethral mass, no tenderness and no urethral scarring          Musculoskeletal: Normal range of motion and moves all extremeties.      Lymphadenopathy: No inguinal adenopathy noted on the right or left side.    Neurological: She is alert and oriented to person, place, and time.    Skin: Skin is warm and dry. No rash noted. She is not diaphoretic. No erythema. No pallor.    Psychiatric: She has a normal mood and affect. Her behavior is normal. Judgment and thought content normal.             Assessment:        1. Acute vaginitis               Plan:   Vaginal hygiene practices discussed.  Affirm collected.    Continue annual well woman exam.    Acute vaginitis  -     VAGINOSIS SCREEN BY DNA PROBE

## 2022-08-16 NOTE — TELEPHONE ENCOUNTER
Spoke with pt about xrays of right shoulder. Xrays were done at [facility] and the images do not show in chart. I offered patient to bring in images on disc or schedule xrays 30 min prior to appt. Pt agreed to arrive at 12:30pm for right shoulder xrays.

## 2022-08-18 ENCOUNTER — TELEPHONE (OUTPATIENT)
Dept: OBSTETRICS AND GYNECOLOGY | Facility: CLINIC | Age: 62
End: 2022-08-18
Payer: MEDICAID

## 2022-08-18 LAB
BACTERIAL VAGINOSIS DNA: NEGATIVE
CANDIDA GLABRATA DNA: POSITIVE
CANDIDA KRUSEI DNA: NEGATIVE
CANDIDA RRNA VAG QL PROBE: POSITIVE
T VAGINALIS RRNA GENITAL QL PROBE: NEGATIVE

## 2022-08-18 NOTE — TELEPHONE ENCOUNTER
After verifying two patient identifiers, results/instructions given. Patient verbalized understanding.

## 2022-08-18 NOTE — TELEPHONE ENCOUNTER
----- Message from Christie Vitale NP sent at 8/18/2022 11:54 AM CDT -----  Please inform pt that her culture is positive for two types of yeast; one is a resistant strand.  Because of this she needs to treat with monistat for 7-14 days.    Christie

## 2022-08-19 DIAGNOSIS — E11.649 CONTROLLED TYPE 2 DIABETES MELLITUS WITH HYPOGLYCEMIA, WITH LONG-TERM CURRENT USE OF INSULIN: ICD-10-CM

## 2022-08-19 DIAGNOSIS — Z79.4 CONTROLLED TYPE 2 DIABETES MELLITUS WITH HYPOGLYCEMIA, WITH LONG-TERM CURRENT USE OF INSULIN: ICD-10-CM

## 2022-08-19 RX ORDER — INSULIN GLARGINE 100 [IU]/ML
48 INJECTION, SOLUTION SUBCUTANEOUS DAILY
Qty: 15 ML | Refills: 5 | Status: SHIPPED | OUTPATIENT
Start: 2022-08-19 | End: 2022-09-28

## 2022-08-23 ENCOUNTER — TELEPHONE (OUTPATIENT)
Dept: SPORTS MEDICINE | Facility: CLINIC | Age: 62
End: 2022-08-23
Payer: MEDICAID

## 2022-08-23 NOTE — TELEPHONE ENCOUNTER
LVM for patient offering reschedule for 9/28 at 2pm. Informed patient to message through portal or call main line if this time does not work    ----- Message from Veronica Barajas sent at 8/23/2022  8:50 AM CDT -----  Contact: [phone]  Type:  Sooner Apoointment Request    Caller is requesting a sooner appointment.  Caller declined first available appointment listed below.  Caller will not accept being placed on the waitlist and is requesting a message be sent to doctor.  Name of Caller:Gina   When is the first available appointment?10/2022   Symptoms:shoulder   Would the patient rather a call back or a response via [facility]? Call back   Best Call Back Number:[phone]  Additional Information:pt is requesting to reschedule due to weather.

## 2022-09-28 ENCOUNTER — HOSPITAL ENCOUNTER (OUTPATIENT)
Dept: RADIOLOGY | Facility: HOSPITAL | Age: 62
Discharge: HOME | End: 2022-09-28
Attending: FAMILY MEDICINE
Payer: MEDICAID

## 2022-09-28 ENCOUNTER — OFFICE VISIT (OUTPATIENT)
Dept: SPORTS MEDICINE | Facility: CLINIC | Age: 62
End: 2022-09-28
Payer: MEDICAID

## 2022-09-28 ENCOUNTER — OFFICE VISIT (OUTPATIENT)
Dept: DIABETES | Facility: CLINIC | Age: 62
End: 2022-09-28
Payer: MEDICAID

## 2022-09-28 VITALS — BODY MASS INDEX: 26.78 KG/M2 | WEIGHT: 170.63 LBS | HEIGHT: 67 IN

## 2022-09-28 DIAGNOSIS — E11.42 DIABETIC PERIPHERAL NEUROPATHY ASSOCIATED WITH TYPE 2 DIABETES MELLITUS: ICD-10-CM

## 2022-09-28 DIAGNOSIS — E06.3 HYPOTHYROIDISM DUE TO HASHIMOTO'S THYROIDITIS: ICD-10-CM

## 2022-09-28 DIAGNOSIS — E03.8 HYPOTHYROIDISM DUE TO HASHIMOTO'S THYROIDITIS: ICD-10-CM

## 2022-09-28 DIAGNOSIS — E11.649 CONTROLLED TYPE 2 DIABETES MELLITUS WITH HYPOGLYCEMIA, WITH LONG-TERM CURRENT USE OF INSULIN: Primary | ICD-10-CM

## 2022-09-28 DIAGNOSIS — M25.511 RIGHT SHOULDER PAIN, UNSPECIFIED CHRONICITY: Primary | ICD-10-CM

## 2022-09-28 DIAGNOSIS — M25.511 RIGHT SHOULDER PAIN, UNSPECIFIED CHRONICITY: ICD-10-CM

## 2022-09-28 DIAGNOSIS — Z79.4 CONTROLLED TYPE 2 DIABETES MELLITUS WITH HYPOGLYCEMIA, WITH LONG-TERM CURRENT USE OF INSULIN: Primary | ICD-10-CM

## 2022-09-28 PROCEDURE — 99999 PR PBB SHADOW E&M-EST. PATIENT-LVL III: CPT | Mod: PBBFAC,,, | Performed by: FAMILY MEDICINE

## 2022-09-28 PROCEDURE — 1159F PR MEDICATION LIST DOCUMENTED IN MEDICAL RECORD: ICD-10-PCS | Mod: CPTII,95,, | Performed by: NURSE PRACTITIONER

## 2022-09-28 PROCEDURE — 4010F PR ACE/ARB THEARPY RXD/TAKEN: ICD-10-PCS | Mod: CPTII,95,, | Performed by: NURSE PRACTITIONER

## 2022-09-28 PROCEDURE — 20610 LARGE JOINT ASPIRATION/INJECTION: R SUBACROMIAL BURSA: ICD-10-PCS | Mod: S$PBB,RT,, | Performed by: FAMILY MEDICINE

## 2022-09-28 PROCEDURE — 3008F BODY MASS INDEX DOCD: CPT | Mod: CPTII,,, | Performed by: FAMILY MEDICINE

## 2022-09-28 PROCEDURE — 3008F PR BODY MASS INDEX (BMI) DOCUMENTED: ICD-10-PCS | Mod: CPTII,,, | Performed by: FAMILY MEDICINE

## 2022-09-28 PROCEDURE — 1160F PR REVIEW ALL MEDS BY PRESCRIBER/CLIN PHARMACIST DOCUMENTED: ICD-10-PCS | Mod: CPTII,95,, | Performed by: NURSE PRACTITIONER

## 2022-09-28 PROCEDURE — 99999 PR PBB SHADOW E&M-EST. PATIENT-LVL III: ICD-10-PCS | Mod: PBBFAC,,, | Performed by: FAMILY MEDICINE

## 2022-09-28 PROCEDURE — 1160F RVW MEDS BY RX/DR IN RCRD: CPT | Mod: CPTII,95,, | Performed by: NURSE PRACTITIONER

## 2022-09-28 PROCEDURE — 99204 OFFICE O/P NEW MOD 45 MIN: CPT | Mod: S$PBB,25,, | Performed by: FAMILY MEDICINE

## 2022-09-28 PROCEDURE — 99214 PR OFFICE/OUTPT VISIT, EST, LEVL IV, 30-39 MIN: ICD-10-PCS | Mod: 95,,, | Performed by: NURSE PRACTITIONER

## 2022-09-28 PROCEDURE — 4010F ACE/ARB THERAPY RXD/TAKEN: CPT | Mod: CPTII,95,, | Performed by: NURSE PRACTITIONER

## 2022-09-28 PROCEDURE — 4010F ACE/ARB THERAPY RXD/TAKEN: CPT | Mod: CPTII,,, | Performed by: FAMILY MEDICINE

## 2022-09-28 PROCEDURE — 99204 PR OFFICE/OUTPT VISIT, NEW, LEVL IV, 45-59 MIN: ICD-10-PCS | Mod: S$PBB,25,, | Performed by: FAMILY MEDICINE

## 2022-09-28 PROCEDURE — 99214 OFFICE O/P EST MOD 30 MIN: CPT | Mod: 95,,, | Performed by: NURSE PRACTITIONER

## 2022-09-28 PROCEDURE — 73030 X-RAY EXAM OF SHOULDER: CPT | Mod: 26,RT,, | Performed by: RADIOLOGY

## 2022-09-28 PROCEDURE — 73030 X-RAY EXAM OF SHOULDER: CPT | Mod: TC,RT

## 2022-09-28 PROCEDURE — 20610 DRAIN/INJ JOINT/BURSA W/O US: CPT | Mod: PBBFAC | Performed by: FAMILY MEDICINE

## 2022-09-28 PROCEDURE — 99213 OFFICE O/P EST LOW 20 MIN: CPT | Mod: PBBFAC,25 | Performed by: FAMILY MEDICINE

## 2022-09-28 PROCEDURE — 4010F PR ACE/ARB THEARPY RXD/TAKEN: ICD-10-PCS | Mod: CPTII,,, | Performed by: FAMILY MEDICINE

## 2022-09-28 PROCEDURE — 73030 XR SHOULDER COMPLETE 2 OR MORE VIEWS RIGHT: ICD-10-PCS | Mod: 26,RT,, | Performed by: RADIOLOGY

## 2022-09-28 PROCEDURE — 1159F MED LIST DOCD IN RCRD: CPT | Mod: CPTII,95,, | Performed by: NURSE PRACTITIONER

## 2022-09-28 RX ORDER — INSULIN GLARGINE 100 [IU]/ML
34 INJECTION, SOLUTION SUBCUTANEOUS DAILY
Qty: 15 ML | Refills: 11 | Status: SHIPPED | OUTPATIENT
Start: 2022-09-28 | End: 2023-10-19 | Stop reason: SDUPTHER

## 2022-09-28 RX ADMIN — BETAMETHASONE SODIUM PHOSPHATE AND BETAMETHASONE ACETATE 6 MG: 3; 3 INJECTION, SUSPENSION INTRA-ARTICULAR; INTRALESIONAL; INTRAMUSCULAR at 02:09

## 2022-09-28 NOTE — PROGRESS NOTES
Established Patient - Audio Only Telehealth Visit     The patient location is: [place]  The chief complaint leading to consultation is: diabetes management follow up   Visit type: Virtual visit with audio only (telephone)  Total time spent with patient: 15 minutes       The reason for the audio only service rather than synchronous audio and video virtual visit was related to technical difficulties or patient preference/necessity.     Each patient to whom I provide medical services by telemedicine is:  (1) informed of the relationship between the physician and patient and the respective role of any other health care provider with respect to management of the patient; and (2) notified that they may decline to receive medical services by telemedicine and may withdraw from such care at any time. Patient verbally consented to receive this service via voice-only telephone call.       HPI: Unfortunately no Dexcom download available- not linked. A1c per Care Everywhere notes 6.3%. Has had some issues with sensors and had to change prematurely at times. She has contacted Dexcom and received replacements when this occurred. Taking Lantus intermittently due to frequent alarms for hypoglycemia, though feels fine and not finger-sticking to confirm. We discussed this in detail and need to take dose of Lantus consistently. Reviewed MOA and how Lantus is adjusted. She verbalized understanding.    Diabetes Medications               empagliflozin (JARDIANCE) 10 mg tablet Take 1 tablet (10 mg total) by mouth once daily. Please see pharmacy note- provider has new contact information. Please update account information for provider.    insulin (LANTUS SOLOSTAR U-100 INSULIN) glargine 100 units/mL SubQ pen Inject 34 Units into the skin once daily. **Please update provider's new contact information- no longer with [facility]**    metFORMIN (GLUCOPHAGE) 1000 MG tablet Take 1,000 mg by mouth 2 (two) times daily.    NOVOLOG FLEXPEN U-100 INSULIN  100 unit/mL (3 mL) InPn pen Inject 5 Units into the skin 3 (three) times daily before meals. Plus sliding scale if needed. Total daily dose of 30 units           Receiving Dexcom supplies- [facility].     Peripheral neuropathy- Better on Lyrica, previously on Gabapentin that did not control and also caused significant fatigue.     Hypothyroidism-she takes Synthroid 125 mg daily. Thyroid function studies are normal.      Hyperlipidemia- On Crestor and Vascepa.    CAD- H/o cardiac stents placed. Followed by cardiology routinely.      Diabetes Management Status    Statin: Taking  ACE/ARB: Taking    Screening or Prevention Patient's value Goal Complete/Controlled?   HgA1C Testing and Control   Lab Results   Component Value Date    HGBA1C 6.3 (H) 08/10/2022      Annually/Less than 8% Yes     Lipid profile : 08/10/2022 Annually No     LDL control No results found for: LDLCALC Annually/Less than 100 mg/dl  No     Nephropathy screening No results found for: LABMICR  No results found for: PROTEINUA  No results found for: UTPCR   Annually No     Blood pressure BP Readings from Last 1 Encounters:   08/16/22 118/72    Less than 140/90 Yes     Dilated retinal exam : 03/30/2021 Annually Yes- Reports [facility] Walters - need to request report- MAL to be signed today     Foot exam   : 06/29/2022 Annually Yes        Assessment and plan:      Diagnoses and all orders for this visit:    Controlled type 2 diabetes mellitus with hypoglycemia, with long-term current use of insulin  -     insulin (LANTUS SOLOSTAR U-100 INSULIN) glargine 100 units/mL SubQ pen; Inject 34 Units into the skin once daily. **Please update provider's new contact information- no longer with [facility]**    Chronic,stable- Continue current medications.    Diabetic peripheral neuropathy associated with type 2 diabetes mellitus    Chronic,stable - On Lyrica. PCP managing.    Hypothyroidism due to Hashimoto's thyroiditis    Chronic,stable - Continue Levothyroxine  125 mcg daily.                        This service was not originating from a related E/M service provided within the previous 7 days nor will  to an E/M service or procedure within the next 24 hours or my soonest available appointment.  Prevailing standard of care was able to be met in this audio-only visit.

## 2022-09-28 NOTE — PROGRESS NOTES
Subjective:     Patient ID: Gina Schroeder is a 61 y.o. female.    Chief Complaint: Pain of the Right Shoulder      HPI: Patient is being seen for right shoulder pain that has been present for years. Extending arm triggers arm pain. Rating pain 7/10 during today's office visit. Applies heating pad and takes Tramadol that she received from her PCP, Dr. Johnson, as needed to help with pain relief. Is not in physical therapy.     Had previous rotator cuff surgery and also history of 3 bulging disc in the low back.  Had physical therapy at [facility] several months ago.  Has night pain which seems to be getting worse.    States that a cortisone injection was helpful in the past.  States that the neck feels okay and does not seem to be involved with the shoulder pain.  Has been on Cymbalta and Valium seems to help somewhat at night.    Past Medical History:   Diagnosis Date    Arthritis     Diabetes mellitus     Heart disease      Past Surgical History:   Procedure Laterality Date    EPIDURAL STEROID INJECTION INTO LUMBAR SPINE      HYSTERECTOMY       Family History   Problem Relation Age of Onset    Breast cancer Maternal Aunt      Social History     Socioeconomic History    Marital status:    Tobacco Use    Smoking status: Never    Smokeless tobacco: Never   Substance and Sexual Activity    Alcohol use: Not Currently    Drug use: Not Currently    Sexual activity: Yes     Partners: Male       Current Outpatient Medications:     aspirin (ECOTRIN) 81 MG EC tablet, 1 tablet, Disp: , Rfl:     buPROPion (WELLBUTRIN XL) 150 MG TB24 tablet, Take 150 mg by mouth once daily., Disp: , Rfl:     cholecalciferol, vitamin D3, (VITAMIN D3) 25 mcg (1,000 unit) capsule, Take 2,000 Units by mouth., Disp: , Rfl:     cyanocobalamin (VITAMIN B-12) 1000 MCG tablet, Take 1,000 mcg by mouth., Disp: , Rfl:     diazePAM (VALIUM) 5 MG tablet, TAKE ONE TABLET BY MOUTH EVERY 12  HOURS AS NEEDED FOR ANXIETY OR SLEEP, Disp: , Rfl:      diphenhydrAMINE-acetaminophen (TYLENOL PM)  mg Tab, Take 2 tablets by mouth nightly., Disp: , Rfl:     DULoxetine (CYMBALTA) 30 MG capsule, Take 30 mg by mouth once daily., Disp: , Rfl:     empagliflozin (JARDIANCE) 10 mg tablet, Take 1 tablet (10 mg total) by mouth once daily. Please see pharmacy note- provider has new contact information. Please update account information for provider., Disp: 30 tablet, Rfl: 11    estrogens, conjugated, (PREMARIN) 1.25 MG tablet, Take 1 tablet (1.25 mg total) by mouth once daily., Disp: 90 tablet, Rfl: 4    hydroCHLOROthiazide (MICROZIDE) 12.5 mg capsule, Take 12.5 mg by mouth once daily., Disp: , Rfl:     hydrocortisone 2.5 % cream, Apply topically 2 (two) times daily., Disp: 20 g, Rfl: 0    icosapent ethyL (VASCEPA) 1 gram Cap, Take 2 g by mouth., Disp: , Rfl:     insulin (LANTUS SOLOSTAR U-100 INSULIN) glargine 100 units/mL SubQ pen, Inject 34 Units into the skin once daily. **Please update provider's new contact information- no longer with [facility]**, Disp: 15 mL, Rfl: 11    isosorbide mononitrate (IMDUR) 30 MG 24 hr tablet, Take 30 mg by mouth once daily., Disp: , Rfl:     levothyroxine (SYNTHROID) 125 MCG tablet, Take 0.5 tablets by mouth every morning., Disp: , Rfl:     losartan (COZAAR) 100 MG tablet, 1 tablet, Disp: , Rfl:     metFORMIN (GLUCOPHAGE) 1000 MG tablet, Take 1,000 mg by mouth 2 (two) times daily., Disp: , Rfl:     metoprolol tartrate (LOPRESSOR) 25 MG tablet, 1 tablet with food, Disp: , Rfl:     NOVOLOG FLEXPEN U-100 INSULIN 100 unit/mL (3 mL) InPn pen, Inject 5 Units into the skin 3 (three) times daily before meals. Plus sliding scale if needed. Total daily dose of 30 units, Disp: 15 mL, Rfl: 5    omeprazole (PRILOSEC) 20 MG capsule, 1 capsule, Disp: , Rfl:     potassium 99 mg Tab, Take by mouth., Disp: , Rfl:     prasugreL (EFFIENT) 10 mg Tab, as directed, Disp: , Rfl:     pregabalin (LYRICA) 50 MG capsule, Take 50 mg by mouth., Disp: , Rfl:     ranolazine  (RANEXA) 500 MG Tb12, , Disp: , Rfl:     rosuvastatin (CRESTOR) 20 MG tablet, Take 20 mg by mouth once daily., Disp: , Rfl:     tiZANidine (ZANAFLEX) 4 MG tablet, Take 4 mg by mouth 3 (three) times daily as needed., Disp: , Rfl:     traMADoL (ULTRAM) 50 mg tablet, Take 50 mg by mouth 2 (two) times daily as needed., Disp: , Rfl:   Review of patient's allergies indicates:  No Known Allergies  Review of Systems   Constitutional:  Negative for chills, fever and weight loss.   Respiratory:  Negative for shortness of breath.    Cardiovascular:  Negative for chest pain and palpitations.     Objective:   Body mass index is 26.73 kg/m².  There were no vitals filed for this visit.        Ortho/SPM Exam-alert and oriented well-nourished well-developed ambulatory no acute distress     Respiratory effort appears normal     Neck-range of motion within normal limits for age / nontender to palpation of the cervical spine     Right shoulder-no acute deformity     Mild to moderate limitation of motion in rotator cuff movements and strength is somewhat decreased to 3 to 4/5.    Mild tenderness to palpation lateral shoulder and posterior at the infraspinatus and teres minor insertions.    Liz impingement equivocal    Neurovascular intact     Psychiatric good affect and cognition     Plan-cortisone injection to shoulder to decrease pain and improve function.  Refer to physical therapy    Recheck here in about 6 weeks for further evaluation, continue medications as indicated.    Patient Instructions   Apply ice to affected area three times a day for (15) fifteen minutes for the next 48 hours, and reduce activity during that time.  Voltaren gel three times a day to affected area if recommended.  Oral medication if recommended.  Physical therapy if recommended at home or at clinic.  Keep recheck visit. Patient Education       Rotator Cuff Injury   The Basics   Written by the doctors and editors at [facility]   What is a rotator cuff  "injury? -- A rotator cuff injury is a condition that can cause shoulder pain. The rotator cuff is made up of 4 shoulder muscles and their tendons. Tendons are strong bands of tissue that connect muscles to bones.  People can get different types of rotator cuff injuries. One common injury is "tendinopathy," which is when people have a problem with 1 of their tendons. In most people with tendinopathy, the tendons are not inflamed or swollen. If they do get inflamed or swollen, doctors call it "tendinitis." Tendinopathy and tendinitis can happen if people use their rotator cuff muscles too much or do a lot of activity with their arms overhead.  Another type of rotator cuff injury is a tear in a tendon. Tears can happen if a person falls on the shoulder or moves the shoulder too fast and with too much force. Tears can also happen as a tendon wears out over time.  What are the symptoms of a rotator cuff injury? -- Most people with tendinopathy or tendinitis have pain where the shoulder meets the top of the arm and down the outer part of the upper arm. The pain is usually worse when they move their arm over their head or lie on their shoulder.  People with a torn tendon usually have shoulder pain and might also have trouble raising their arm overhead.  Will I need tests? -- You might. Your doctor or nurse will talk with you and do an exam. If he or she suspects a tear, you might need an imaging test of the shoulder. Imaging tests create pictures of the inside of the body.  How is a rotator cuff injury treated? -- Many rotator cuff injuries get better on their own, but they can take months to heal completely. To help your shoulder get better, you can:  Rest your shoulder - Avoid doing activities that cause pain or strain your shoulder, such as raising your arm overhead or reaching behind you. In general, try to keep your arm down, close to, and in front of your body. But you can move your shoulder gently if you need " to.  Ice your shoulder - Put a cold gel pack, bag of ice, or bag of frozen vegetables on the injured area every 1 to 2 hours, for 15 minutes each time, as needed. Put a thin towel between the ice (or other cold object) and your skin.  Take medicine to reduce the pain and swelling - Your doctor or nurse might recommend that you take ibuprofen (sample brand names: Advil, Motrin) or naproxen (sample brand names: Aleve, Naprosyn).  Some tears, especially large tears, might need to be treated with surgery.  Is there anything I can do on my own to feel better? -- Yes. Different exercises can help your shoulder feel better. Ask your doctor or nurse which exercises you should do, when to start them, and how often to do them.  Some exercises help keep your shoulder from getting too stiff. One of these is called the pendulum stretch. To do this exercise, let your arm relax and hang down. Move your arm back and forth, then side to side, and then around in small circles (figure 1). Your doctor or nurse can also show you other stretches that you can do.  Other exercises can help strengthen your shoulder muscles. Your doctor, nurse, or physical therapist (exercise expert) can show you how to do these types of exercises.  When you do shoulder exercises, it's important to:  Warm up your shoulder first by taking a hot shower or bath, or massaging the area  Start slowly and make the exercises harder over time  Know that some soreness is normal. If you have sharp or tearing pain, stop what you're doing and let your doctor or nurse know.  What if my symptoms don't get better? -- If your symptoms don't get better, talk with your doctor or nurse about other possible treatments, such as:  Getting a shot of medicine into your shoulder  Surgery  All topics are updated as new evidence becomes available and our peer review process is complete.  This topic retrieved from What's Trending on: Sep 21, 2021.  Topic 12946 Version 5.0  Release: 29.4.2 -  C29.263  © 2021 UpToDate, Inc. and/or its affiliates. All rights reserved.  figure 1: Pendulum swing     To do this exercise, you can sit or stand. Relax your arm and let it hang down. Move your arm back and forth, then side to side, and then around in small circles in both directions. After about a week, you can make the exercise harder by making bigger movements or holding a weight in your hand.  Graphic 40240 Version 3.0    Consumer Information Use and Disclaimer   This information is not specific medical advice and does not replace information you receive from your health care provider. This is only a brief summary of general information. It does NOT include all information about conditions, illnesses, injuries, tests, procedures, treatments, therapies, discharge instructions or life-style choices that may apply to you. You must talk with your health care provider for complete information about your health and treatment options. This information should not be used to decide whether or not to accept your health care provider's advice, instructions or recommendations. Only your health care provider has the knowledge and training to provide advice that is right for you. The use of this information is governed by the [employer] End User License Agreement, available at https://www.WHOOP/en/solutions/[employer]/about/prieto.The use of [employer] content is governed by the [employer] Terms of Use. ©2021 UpToDate, Inc. All rights reserved.  Copyright   © 2021 UpToDate, Inc. and/or its affiliates. All rights reserved.    IMAGING: X-ray Shoulder 2 or More Views Right  Narrative: EXAMINATION:  XR SHOULDER COMPLETE 2 OR MORE VIEWS RIGHT    CLINICAL HISTORY:  Pain in right shoulder    TECHNIQUE:  Two or three views of the right shoulder were performed.    COMPARISON:  None    FINDINGS:  No acute abnormality with mild AC and glenohumeral joint degenerative findings.  Lung parenchyma clear.  Impression: As  above    Electronically signed by: Darrius Grossman MD  Date:    09/28/2022  Time:    13:15       Radiographs / Imaging : Relevant imaging results reviewed by me and interpreted by me, discussed with the patient and / or family -agree with report      Assessment:     Encounter Diagnoses   Name Primary?    Right shoulder pain, unspecified chronicity Yes    Diabetic peripheral neuropathy associated with type 2 diabetes mellitus         Plan:   Right shoulder pain, unspecified chronicity  -     Large Joint Aspiration/Injection: R subacromial bursa    Diabetic peripheral neuropathy associated with type 2 diabetes mellitus      The patient verbalized good understanding of the medical issues discussed today and expressed appreciation for the care provided.  Patient was given the opportunity to ask questions and be an active participant in their medical care. Patient had no further questions or concerns at this time.     The patient was encouraged to participate in appropriate physical activity.      Fabián Purcell M.D.  [facility]        This note was dictated using voice recognition software and may have sound a like errors.

## 2022-09-29 NOTE — PATIENT INSTRUCTIONS
Labs, fasting in July.    1. Limit carbs to no more than 30 grams with each meal. Never eat carbs by themselves, always add protein. Make snacks low carb or non-carb only.    2. Continue checking blood sugar 4 times daily: Blood sugar goals should be a fasting blood sugar between , and no blood sugars throughout the day over 180 is good, less than 160 better. Keep a log book and bring with you to all appointments along with your glucose meter.    3. Medications adjusted for today's visit include:  Continue Metformin twice a day.    Continue Jardiance once daily.    Continue Lantus 34 units every morning.    Continue Novolog 5 units three times a day before each main meal, plus sliding scale if needed.     160-200: +1 unit  201-240: +2 units  241-280: +3 units  281-320: +4 units  321-360: +5 units    4. Carry glucose tablets with you at all times to treat a low blood sugar episode (less than 70). Chew 4 tablets and re-check blood sugar in 15 minutes. If still low, repeat this. Always call the clinic to give an update for any low blood sugar episodes.    5. Exercise as tolerated.    6. Follow-up for your next visit in 3 months.    7. Please either drop off, fax, or MyChart your readings to me as needed.

## 2022-10-04 PROBLEM — M25.511 RIGHT SHOULDER PAIN: Status: ACTIVE | Noted: 2022-10-04

## 2022-10-04 NOTE — PATIENT INSTRUCTIONS
"Apply ice to affected area three times a day for (15) fifteen minutes for the next 48 hours, and reduce activity during that time.  Voltaren gel three times a day to affected area if recommended.  Oral medication if recommended.  Physical therapy if recommended at home or at clinic.  Keep recheck visit. Patient Education       Rotator Cuff Injury   The Basics   Written by the doctors and editors at [facility]   What is a rotator cuff injury? -- A rotator cuff injury is a condition that can cause shoulder pain. The rotator cuff is made up of 4 shoulder muscles and their tendons. Tendons are strong bands of tissue that connect muscles to bones.  People can get different types of rotator cuff injuries. One common injury is "tendinopathy," which is when people have a problem with 1 of their tendons. In most people with tendinopathy, the tendons are not inflamed or swollen. If they do get inflamed or swollen, doctors call it "tendinitis." Tendinopathy and tendinitis can happen if people use their rotator cuff muscles too much or do a lot of activity with their arms overhead.  Another type of rotator cuff injury is a tear in a tendon. Tears can happen if a person falls on the shoulder or moves the shoulder too fast and with too much force. Tears can also happen as a tendon wears out over time.  What are the symptoms of a rotator cuff injury? -- Most people with tendinopathy or tendinitis have pain where the shoulder meets the top of the arm and down the outer part of the upper arm. The pain is usually worse when they move their arm over their head or lie on their shoulder.  People with a torn tendon usually have shoulder pain and might also have trouble raising their arm overhead.  Will I need tests? -- You might. Your doctor or nurse will talk with you and do an exam. If he or she suspects a tear, you might need an imaging test of the shoulder. Imaging tests create pictures of the inside of the body.  How is a rotator cuff " injury treated? -- Many rotator cuff injuries get better on their own, but they can take months to heal completely. To help your shoulder get better, you can:  Rest your shoulder - Avoid doing activities that cause pain or strain your shoulder, such as raising your arm overhead or reaching behind you. In general, try to keep your arm down, close to, and in front of your body. But you can move your shoulder gently if you need to.  Ice your shoulder - Put a cold gel pack, bag of ice, or bag of frozen vegetables on the injured area every 1 to 2 hours, for 15 minutes each time, as needed. Put a thin towel between the ice (or other cold object) and your skin.  Take medicine to reduce the pain and swelling - Your doctor or nurse might recommend that you take ibuprofen (sample brand names: Advil, Motrin) or naproxen (sample brand names: Aleve, Naprosyn).  Some tears, especially large tears, might need to be treated with surgery.  Is there anything I can do on my own to feel better? -- Yes. Different exercises can help your shoulder feel better. Ask your doctor or nurse which exercises you should do, when to start them, and how often to do them.  Some exercises help keep your shoulder from getting too stiff. One of these is called the pendulum stretch. To do this exercise, let your arm relax and hang down. Move your arm back and forth, then side to side, and then around in small circles (figure 1). Your doctor or nurse can also show you other stretches that you can do.  Other exercises can help strengthen your shoulder muscles. Your doctor, nurse, or physical therapist (exercise expert) can show you how to do these types of exercises.  When you do shoulder exercises, it's important to:  Warm up your shoulder first by taking a hot shower or bath, or massaging the area  Start slowly and make the exercises harder over time  Know that some soreness is normal. If you have sharp or tearing pain, stop what you're doing and let  your doctor or nurse know.  What if my symptoms don't get better? -- If your symptoms don't get better, talk with your doctor or nurse about other possible treatments, such as:  Getting a shot of medicine into your shoulder  Surgery  All topics are updated as new evidence becomes available and our peer review process is complete.  This topic retrieved from [employer] on: Sep 21, 2021.  Topic 88356 Version 5.0  Release: 29.4.2 - C29.263  © 2021 UpToDate, Inc. and/or its affiliates. All rights reserved.  figure 1: Pendulum swing     To do this exercise, you can sit or stand. Relax your arm and let it hang down. Move your arm back and forth, then side to side, and then around in small circles in both directions. After about a week, you can make the exercise harder by making bigger movements or holding a weight in your hand.  Graphic 27920 Version 3.0    Consumer Information Use and Disclaimer   This information is not specific medical advice and does not replace information you receive from your health care provider. This is only a brief summary of general information. It does NOT include all information about conditions, illnesses, injuries, tests, procedures, treatments, therapies, discharge instructions or life-style choices that may apply to you. You must talk with your health care provider for complete information about your health and treatment options. This information should not be used to decide whether or not to accept your health care provider's advice, instructions or recommendations. Only your health care provider has the knowledge and training to provide advice that is right for you. The use of this information is governed by the [employer] End User License Agreement, available at https://www.[employer]/en/solutions/[employer]/about/prieto.The use of [employer] content is governed by the [employer] Terms of Use. ©2021 UpToDate, Inc. All rights reserved.  Copyright   © 2021 UpToDate, Inc. and/or its affiliates.  All rights reserved.

## 2022-10-04 NOTE — PROCEDURES
Large Joint Aspiration/Injection: R subacromial bursa    Date/Time: 9/28/2022 2:00 PM  Performed by: Fabián Purcell MD  Authorized by: Fabián Purcell MD     Consent Done?:  Yes (Verbal)  Indications:  Pain  Site marked: the procedure site was marked    Timeout: prior to procedure the correct patient, procedure, and site was verified    Prep: patient was prepped and draped in usual sterile fashion    Local anesthetic:  Bupivacaine 0.25% without epinephrine and lidocaine 1% without epinephrine  Approach:  Posterior  Location:  Shoulder  Site:  R subacromial bursa  Medications:  6 mg betamethasone acetate-betamethasone sodium phosphate 6 mg/mL  Patient tolerance:  Patient tolerated the procedure well with no immediate complications

## 2022-10-05 RX ORDER — BETAMETHASONE SODIUM PHOSPHATE AND BETAMETHASONE ACETATE 3; 3 MG/ML; MG/ML
6 INJECTION, SUSPENSION INTRA-ARTICULAR; INTRALESIONAL; INTRAMUSCULAR; SOFT TISSUE
Status: DISCONTINUED | OUTPATIENT
Start: 2022-09-28 | End: 2022-10-05 | Stop reason: HOSPADM

## 2022-10-24 ENCOUNTER — TELEPHONE (OUTPATIENT)
Dept: DIABETES | Facility: CLINIC | Age: 62
End: 2022-10-24
Payer: MEDICAID

## 2022-10-24 NOTE — TELEPHONE ENCOUNTER
Returned pt call. Pt staes that diabetes supplies company that she uses contacted her stating that they needed a recent progress notes on her last visit here. Pt gave me the number to call them back, but the number she gave me is not a working number. Nurse called pt to let her know that, but she did not answer the call. Left message to call us back. No answer. Left voice message to call us back.

## 2022-10-24 NOTE — TELEPHONE ENCOUNTER
----- Message from Lis Macdonald sent at 10/24/2022 11:08 AM CDT -----  Contact: Gina  Pt states that chart notes and or physician orders need to be sent to diabetic supply company so they can release dexcom. Phone number to office is [phone]. Please call pt back with updates at [phone].          Thanks  DD

## 2022-11-02 ENCOUNTER — TELEPHONE (OUTPATIENT)
Dept: SPORTS MEDICINE | Facility: CLINIC | Age: 62
End: 2022-11-02
Payer: MEDICAID

## 2022-11-02 NOTE — TELEPHONE ENCOUNTER
Spoke with pt about rescheduling appt , the pt accepted next available date of Jan 4th. The pt also wanted to know the readings from last xray. The pt accepted and verbalized understanding from call.

## 2022-11-14 ENCOUNTER — TELEPHONE (OUTPATIENT)
Dept: DIABETES | Facility: CLINIC | Age: 62
End: 2022-11-14
Payer: MEDICAID

## 2022-11-14 NOTE — TELEPHONE ENCOUNTER
----- Message from Mei Ghotra sent at 11/14/2022  4:12 PM CST -----  Contact: April/Diabetes Supplies  April with diabetes supplies is calling to speak with a nurse regarding orders. Reports needing chart and clinical notes for patient. Please give April a callback at [phone]

## 2022-11-23 ENCOUNTER — TELEPHONE (OUTPATIENT)
Dept: DIABETES | Facility: CLINIC | Age: 62
End: 2022-11-23
Payer: MEDICAID

## 2022-11-23 NOTE — TELEPHONE ENCOUNTER
Left message confirming fax was received.    ----- Message from Veronica Barajas sent at 11/23/2022 11:31 AM CST -----  Aleks with Diabetes management and supplies would like to consult with a nurse in regards to a fax that was sent over on the patients behalf. She stated they are needing authorization for patient prescription. Please give her a call back at [phone] or fax [phone]. Thanks r/s

## 2022-11-29 ENCOUNTER — TELEPHONE (OUTPATIENT)
Dept: DIABETES | Facility: CLINIC | Age: 62
End: 2022-11-29
Payer: MEDICAID

## 2022-11-29 NOTE — TELEPHONE ENCOUNTER
----- Message from Paz Andrews MA sent at 11/29/2022  8:35 AM CST -----  Contact: April/ Diabetes Managment    ----- Message -----  From: Tani Bagley  Sent: 11/28/2022   8:59 AM CST  To: Alma Rosa Gustafson    April/ Diabetes Management is calling to speak with the nurse regarding fax she sent over/ to see if it had been received and is requesting a call back at [phone]

## 2022-12-28 ENCOUNTER — TELEPHONE (OUTPATIENT)
Dept: SPORTS MEDICINE | Facility: CLINIC | Age: 62
End: 2022-12-28
Payer: MEDICAID

## 2022-12-28 ENCOUNTER — PATIENT MESSAGE (OUTPATIENT)
Dept: SPORTS MEDICINE | Facility: CLINIC | Age: 62
End: 2022-12-28
Payer: MEDICAID

## 2022-12-28 NOTE — TELEPHONE ENCOUNTER
LVM asking patient to return call to r/s office visit. Provided call back number and option to send message via my chart.

## 2023-01-03 ENCOUNTER — TELEPHONE (OUTPATIENT)
Dept: SPORTS MEDICINE | Facility: CLINIC | Age: 63
End: 2023-01-03
Payer: MEDICAID

## 2023-01-03 ENCOUNTER — TELEPHONE (OUTPATIENT)
Dept: DIABETES | Facility: CLINIC | Age: 63
End: 2023-01-03
Payer: MEDICAID

## 2023-01-03 NOTE — TELEPHONE ENCOUNTER
----- Message from Radha Gage sent at 1/3/2023 11:06 AM CST -----  Contact: [facility]  Patient is calling to speak with the department regarding rescheduling appointment 01/04/2023. Explains needs to be sometime next week around 1 pm attempted to reschedule but first appointment was 02/14/2023 @ 4 pm. Please give a call back at [phone] as requested.  Thanks  LR

## 2023-01-03 NOTE — TELEPHONE ENCOUNTER
Spoke with pt in reference to rescheduling appt. The pt accepted new appt date and time of Jan 26 at 1pm. Gave the pt call back number to call if any other questions or concerns. The pt accepted and verbalized understanding.

## 2023-01-05 ENCOUNTER — TELEPHONE (OUTPATIENT)
Dept: GASTROENTEROLOGY | Facility: CLINIC | Age: 63
End: 2023-01-05
Payer: MEDICAID

## 2023-01-05 NOTE — TELEPHONE ENCOUNTER
Referral from pts PCP, Dr Johnson, for History of colon polyps, sent to Dr Velazquez.   Called and scheduled the first available appt with Ms Tati Morel for 07/2023. Pt agreed to plan and verbalized understanding. Verified address and mailed appt slip to pts home address.

## 2023-01-05 NOTE — TELEPHONE ENCOUNTER
MD CARISSA Peralta Staff  Referral from Dr. Johnson. Not an urgent issue, just history of colon polyp. Next available is OK and patient may need an office visit.     Thanks

## 2023-01-05 NOTE — TELEPHONE ENCOUNTER
Gastroenterology Diagnoses    History of colon polyps     Duke Johnson MD    [address] [place] [address]    Phone: [phone]    Fax: [phone]   Sunil Velazquez MD    [address] [facility]    [place] [address]    Phone: [phone]    Fax: [phone]

## 2023-01-12 ENCOUNTER — PATIENT MESSAGE (OUTPATIENT)
Dept: GASTROENTEROLOGY | Facility: CLINIC | Age: 63
End: 2023-01-12
Payer: MEDICAID

## 2023-01-12 DIAGNOSIS — E11.649 CONTROLLED TYPE 2 DIABETES MELLITUS WITH HYPOGLYCEMIA, WITH LONG-TERM CURRENT USE OF INSULIN: ICD-10-CM

## 2023-01-12 DIAGNOSIS — Z79.4 CONTROLLED TYPE 2 DIABETES MELLITUS WITH HYPOGLYCEMIA, WITH LONG-TERM CURRENT USE OF INSULIN: ICD-10-CM

## 2023-01-12 NOTE — TELEPHONE ENCOUNTER
Patient has changed pharmacy and is requesting med be sent to new pharmacy. I have updated patients pharmacy.

## 2023-01-18 ENCOUNTER — OFFICE VISIT (OUTPATIENT)
Dept: DIABETES | Facility: CLINIC | Age: 63
End: 2023-01-18
Payer: MEDICAID

## 2023-01-18 VITALS
BODY MASS INDEX: 29.41 KG/M2 | OXYGEN SATURATION: 97 % | WEIGHT: 187.38 LBS | HEART RATE: 73 BPM | DIASTOLIC BLOOD PRESSURE: 72 MMHG | SYSTOLIC BLOOD PRESSURE: 124 MMHG | HEIGHT: 67 IN

## 2023-01-18 DIAGNOSIS — E03.8 HYPOTHYROIDISM DUE TO HASHIMOTO'S THYROIDITIS: ICD-10-CM

## 2023-01-18 DIAGNOSIS — E06.3 HYPOTHYROIDISM DUE TO HASHIMOTO'S THYROIDITIS: ICD-10-CM

## 2023-01-18 DIAGNOSIS — Z79.4 CONTROLLED TYPE 2 DIABETES MELLITUS WITH HYPOGLYCEMIA, WITH LONG-TERM CURRENT USE OF INSULIN: Primary | ICD-10-CM

## 2023-01-18 DIAGNOSIS — R42 VERTIGO: ICD-10-CM

## 2023-01-18 DIAGNOSIS — E11.42 DIABETIC PERIPHERAL NEUROPATHY ASSOCIATED WITH TYPE 2 DIABETES MELLITUS: ICD-10-CM

## 2023-01-18 DIAGNOSIS — E11.649 CONTROLLED TYPE 2 DIABETES MELLITUS WITH HYPOGLYCEMIA, WITH LONG-TERM CURRENT USE OF INSULIN: Primary | ICD-10-CM

## 2023-01-18 PROCEDURE — 95251 CONT GLUC MNTR ANALYSIS I&R: CPT | Mod: ,,, | Performed by: NURSE PRACTITIONER

## 2023-01-18 PROCEDURE — 95251 PR GLUCOSE MONITOR, 72 HOUR, PHYS INTERP: ICD-10-PCS | Mod: ,,, | Performed by: NURSE PRACTITIONER

## 2023-01-18 PROCEDURE — 99214 PR OFFICE/OUTPT VISIT, EST, LEVL IV, 30-39 MIN: ICD-10-PCS | Mod: S$PBB,,, | Performed by: NURSE PRACTITIONER

## 2023-01-18 PROCEDURE — 3074F PR MOST RECENT SYSTOLIC BLOOD PRESSURE < 130 MM HG: ICD-10-PCS | Mod: CPTII,,, | Performed by: NURSE PRACTITIONER

## 2023-01-18 PROCEDURE — 3074F SYST BP LT 130 MM HG: CPT | Mod: CPTII,,, | Performed by: NURSE PRACTITIONER

## 2023-01-18 PROCEDURE — 99999 PR PBB SHADOW E&M-EST. PATIENT-LVL V: CPT | Mod: PBBFAC,,, | Performed by: NURSE PRACTITIONER

## 2023-01-18 PROCEDURE — 1160F PR REVIEW ALL MEDS BY PRESCRIBER/CLIN PHARMACIST DOCUMENTED: ICD-10-PCS | Mod: CPTII,,, | Performed by: NURSE PRACTITIONER

## 2023-01-18 PROCEDURE — 3078F PR MOST RECENT DIASTOLIC BLOOD PRESSURE < 80 MM HG: ICD-10-PCS | Mod: CPTII,,, | Performed by: NURSE PRACTITIONER

## 2023-01-18 PROCEDURE — 99214 OFFICE O/P EST MOD 30 MIN: CPT | Mod: S$PBB,,, | Performed by: NURSE PRACTITIONER

## 2023-01-18 PROCEDURE — 1159F PR MEDICATION LIST DOCUMENTED IN MEDICAL RECORD: ICD-10-PCS | Mod: CPTII,,, | Performed by: NURSE PRACTITIONER

## 2023-01-18 PROCEDURE — 3078F DIAST BP <80 MM HG: CPT | Mod: CPTII,,, | Performed by: NURSE PRACTITIONER

## 2023-01-18 PROCEDURE — 3008F PR BODY MASS INDEX (BMI) DOCUMENTED: ICD-10-PCS | Mod: CPTII,,, | Performed by: NURSE PRACTITIONER

## 2023-01-18 PROCEDURE — 99999 PR PBB SHADOW E&M-EST. PATIENT-LVL V: ICD-10-PCS | Mod: PBBFAC,,, | Performed by: NURSE PRACTITIONER

## 2023-01-18 PROCEDURE — 3008F BODY MASS INDEX DOCD: CPT | Mod: CPTII,,, | Performed by: NURSE PRACTITIONER

## 2023-01-18 PROCEDURE — 1160F RVW MEDS BY RX/DR IN RCRD: CPT | Mod: CPTII,,, | Performed by: NURSE PRACTITIONER

## 2023-01-18 PROCEDURE — 1159F MED LIST DOCD IN RCRD: CPT | Mod: CPTII,,, | Performed by: NURSE PRACTITIONER

## 2023-01-18 PROCEDURE — 99215 OFFICE O/P EST HI 40 MIN: CPT | Mod: PBBFAC | Performed by: NURSE PRACTITIONER

## 2023-01-18 RX ORDER — METHOCARBAMOL 750 MG/1
750 TABLET, FILM COATED ORAL 4 TIMES DAILY
COMMUNITY
Start: 2023-01-13

## 2023-01-18 RX ORDER — ORPHENADRINE CITRATE 100 MG/1
100 TABLET, EXTENDED RELEASE ORAL 2 TIMES DAILY PRN
COMMUNITY
Start: 2022-12-30

## 2023-01-18 RX ORDER — VITAMIN E 268 MG
1 CAPSULE ORAL EVERY MORNING
Status: ON HOLD | COMMUNITY
End: 2023-08-23 | Stop reason: HOSPADM

## 2023-01-18 NOTE — PROGRESS NOTES
Subjective:         Patient ID: Gina Schroeder is a 62 y.o. female.  Patient's current PCP is Duke Johnson MD.     Chief Complaint: Follow-up and Diabetes Mellitus    HPI  Gina Schroeder is a 62 y.o. White female presenting for a follow up for diabetes. Patient has been diagnosed with type 2 diabetes since 2010 .    CURRENT DM MEDICATIONS:     Diabetes Medications               empagliflozin (JARDIANCE) 10 mg tablet Take 1 tablet (10 mg total) by mouth once daily. Please see pharmacy note- provider has new contact information. Please update account information for provider.    insulin (LANTUS SOLOSTAR U-100 INSULIN) glargine 100 units/mL SubQ pen Inject 34 Units into the skin once daily. **Please update provider's new contact information- no longer with [facility]** Taking 35 units daily     metFORMIN (GLUCOPHAGE) 1000 MG tablet Take 1,000 mg by mouth 2 (two) times daily.    NOVOLOG FLEXPEN U-100 INSULIN 100 unit/mL (3 mL) InPn pen Inject 5 Units into the skin 3 (three) times daily before meals. Plus sliding scale if needed. Total daily dose of 30 units           Past failed treatment include:  DPP4's and GLP1's contraindicated due to h/o pancreatitis. V-go caused a skin reaction.  Tresiba and Fiasp switched to current insulins due to formulary with insurance switch.    Blood glucose testing continuous per Dexcom  Preferred lab: [facility]     Any episodes of hypoglycemia? 2% low, <1% significant    Complications related to diabetes: peripheral neuropathy and cardiovascular disease    Her blood sugar in the clinic today was:   Lab Results   Component Value Date    POCGLU 119 (A) 06/29/2022     Gina Schroeder presents today for follow up visit to discuss diabetes management.   Had a steroid injection R shoulder 9/28/23 - markedly elevated Bgs 300-400 during that time that did improve after a few days - helped the pain tremendously, now having minimal pain. She does think another steroid injection will be needed in the  future.  Having a dizzy spells - seeing specialists with no resolution. Fell and hit head this AM-small bruise noted to R upper forehead-she states she has notified her PCP. Reports has had scans that were inconclusive, including MRI and carotid artery studies. Needs ENT.   She will be seeing a vascular physician soon.  Was having sweating episodes overnight- reduced Lantus and states resolved.    Per Dexcom download, for the last 14 days: average glucose of 142 and remains 82% in range. Occasional glucose spike associated with carb splurge, however nothing consistent enough to warrant an adjustment. 2% mild hypoglycemia, <1% low however no further episodes since Lantus was reduced. Based on review, plan to continue insulins as current.         She is in the process of applying for long-term disability.    Peripheral neuropathy- Better on Lyrica, previously on Gabapentin that did not control and also caused significant fatigue.     Hypothyroidism-she takes Synthroid 125 mg daily. Thyroid function studies are normal.      Hyperlipidemia- On Crestor and Vascepa.    CAD- H/o cardiac stents placed. Followed by cardiology routinely.     Lab Results   Component Value Date    HGBA1C 6.3 (H) 12/07/2022    HGBA1C 6.3 (H) 08/10/2022    HGBA1C 6.6 (H) 04/07/2022     STANDARDS OF CARE  Diabetes Management Status    Statin: Taking  ACE/ARB: Taking    Screening or Prevention Patient's value Goal Complete/Controlled?   HgA1C Testing and Control   Lab Results   Component Value Date    HGBA1C 6.3 (H) 12/07/2022      Annually/Less than 8% Yes     Lipid profile : 12/07/2022 Annually No     LDL control No results found for: LDLCALC Annually/Less than 100 mg/dl  No     Nephropathy screening No results found for: LABMICR  No results found for: PROTEINUA  No results found for: UTPCR   Annually No     Blood pressure BP Readings from Last 1 Encounters:   01/18/23 124/72    Less than 140/90 Yes     Dilated retinal exam : 09/23/2022 Annually  Yes     Foot exam   : 06/29/2022 Annually Yes        Labs reviewed and are noted below.    Lab Results   Component Value Date     11/17/2021    K 4.1 11/17/2021     11/17/2021    ANIONGAP 8 11/17/2021    CREATININE 0.7 11/17/2021    ESTGFRAFRICA >60.0 11/17/2021    EGFRNONAA >60.0 11/17/2021    BUN 10 11/17/2021    CO2 28 11/17/2021    GLU 74 11/17/2021     Lab Results   Component Value Date    GLUTAMICACID 0.00 11/17/2021    CPEPTIDE 0.37 (L) 11/17/2021    CALCIUM 9.4 11/17/2021     Lab Results   Component Value Date    CPEPTIDE 0.37 (L) 11/17/2021     Lab Results   Component Value Date    GLUTAMICACID 0.00 11/17/2021     Glucose   Date Value Ref Range Status   11/17/2021 74 70 - 110 mg/dL Final     Anion Gap   Date Value Ref Range Status   11/17/2021 8 8 - 16 mmol/L Final     eGFR if    Date Value Ref Range Status   11/17/2021 >60.0 >60 mL/min/1.73 m^2 Final     eGFR if non    Date Value Ref Range Status   11/17/2021 >60.0 >60 mL/min/1.73 m^2 Final     Comment:     Calculation used to obtain the estimated glomerular filtration  rate (eGFR) is the CKD-EPI equation.          The following portions of the patient's history were reviewed and updated as appropriate: allergies, current medications, past family history, past medical history, past social history, past surgical history and problem list.    Review of patient's allergies indicates:  No Known Allergies  Social History     Socioeconomic History    Marital status:    Tobacco Use    Smoking status: Never    Smokeless tobacco: Never   Substance and Sexual Activity    Alcohol use: Not Currently    Drug use: Not Currently    Sexual activity: Yes     Partners: Male     Past Medical History:   Diagnosis Date    Arthritis     Diabetes mellitus     Heart disease      REVIEW OF SYSTEMS:  Constitutional: Reports dizziness- see HPI  Eyes No history of DRAshley  Cardiovascular: History of cardiac stents placed.  GI: History of  "pancreatitis  : No  side effects associated with Jardiance.  Neuro: Positive neuropathy.  PSYCH: No tobacco use.  ENDO: See HPI.        Objective:      Vitals:    01/18/23 1408   BP: 124/72   Pulse: 73     RESPIRATORY: No respiratory distress  NEUROLOGIC: Cranial nerves II-XII grossly intact.   PSYCHIATRIC: Alert & oriented x3. Normal mood and affect.  FOOT EXAMINATION: UTD  Assessment:       1. Controlled type 2 diabetes mellitus with hypoglycemia, with long-term current use of insulin    2. Diabetic peripheral neuropathy associated with type 2 diabetes mellitus    3. Hypothyroidism due to Hashimoto's thyroiditis        Plan:   Gina was seen today for diabetes mellitus.    Diagnoses and all orders for this visit:    Controlled type 2 diabetes mellitus with hypoglycemia, with long-term current use of insulin  -     POCT Glucose, Hand-Held Device    Chronic-    Medications adjusted for today's visit include:  Continue Metformin twice a day.    Continue Jardiance once daily.    Continue Lantus 35 units every morning.    Continue Novolog 5 units three times a day before each main meal, plus sliding scale if needed.     160-200: +1 unit  201-240: +2 units  241-280: +3 units  281-320: +4 units  321-360: +5 units    Diabetic peripheral neuropathy associated with type 2 diabetes mellitus    Chronic- Continue Lyrica. PCP managing.    Hypothyroidism due to Hashimoto's thyroiditis    Chronic-Continue Levothyroxine 125 mcg daily.    Vertigo  -     Ambulatory referral/consult to ENT; Future    Chronic,intermittent- MRI,carotid artery studies per her report inconclusive. Would like to see ENT.    - Follow up: 3 months    Portions of this note may have been created with voice recognition software. Occasional "wrong-word" or "sound-a-like" substitutions may have occurred due to the inherent limitations of voice recognition software. Please, read the note carefully and recognize, using context, where substitutions have " occurred.             SERGIO Fox-C [facility]

## 2023-01-18 NOTE — PATIENT INSTRUCTIONS
1. Limit carbs to no more than 30 grams with each meal. Never eat carbs by themselves, always add protein. Make snacks low carb or non-carb only.    2. Continue checking blood sugar 4 times daily: Blood sugar goals should be a fasting blood sugar between , and no blood sugars throughout the day over 180 is good, less than 160 better. Keep a log book and bring with you to all appointments along with your glucose meter.    3. Medications adjusted for today's visit include:  Continue Metformin twice a day.    Continue Jardiance once daily.    Continue Lantus 35 units every morning.    Continue Novolog 5 units three times a day before each main meal, plus sliding scale if needed.     160-200: +1 unit  201-240: +2 units  241-280: +3 units  281-320: +4 units  321-360: +5 units    4. Carry glucose tablets with you at all times to treat a low blood sugar episode (less than 70). Chew 4 tablets and re-check blood sugar in 15 minutes. If still low, repeat this. Always call the clinic to give an update for any low blood sugar episodes.    5. Exercise as tolerated.    6. Follow-up for your next visit in 3 months.    7. Please either drop off, fax, or MyChart your readings to me as needed.

## 2023-01-19 ENCOUNTER — TELEPHONE (OUTPATIENT)
Dept: SPORTS MEDICINE | Facility: CLINIC | Age: 63
End: 2023-01-19
Payer: MEDICAID

## 2023-01-25 DIAGNOSIS — Z79.4 CONTROLLED TYPE 2 DIABETES MELLITUS WITH HYPOGLYCEMIA, WITH LONG-TERM CURRENT USE OF INSULIN: ICD-10-CM

## 2023-01-25 DIAGNOSIS — E11.649 CONTROLLED TYPE 2 DIABETES MELLITUS WITH HYPOGLYCEMIA, WITH LONG-TERM CURRENT USE OF INSULIN: ICD-10-CM

## 2023-01-25 RX ORDER — INSULIN ASPART 100 [IU]/ML
5 INJECTION, SOLUTION INTRAVENOUS; SUBCUTANEOUS
Qty: 15 ML | Refills: 5 | Status: SHIPPED | OUTPATIENT
Start: 2023-01-25

## 2023-01-25 NOTE — TELEPHONE ENCOUNTER
----- Message from Alba Ogden sent at 1/25/2023  1:56 PM CST -----  Contact: Gina Fuentes is calling to speak to the nurse to request a refill on the medication NOVOLOG FLEXPEN U-100 INSULIN 100 unit/mL (3 mL) InPn pen. If any questions please contact the patient at [phone]    Shakir ORTEGA

## 2023-02-06 ENCOUNTER — PATIENT MESSAGE (OUTPATIENT)
Dept: OTOLARYNGOLOGY | Facility: CLINIC | Age: 63
End: 2023-02-06
Payer: MEDICAID

## 2023-02-09 ENCOUNTER — OFFICE VISIT (OUTPATIENT)
Dept: SPORTS MEDICINE | Facility: CLINIC | Age: 63
End: 2023-02-09
Payer: MEDICAID

## 2023-02-09 DIAGNOSIS — M25.511 RIGHT SHOULDER PAIN, UNSPECIFIED CHRONICITY: Primary | ICD-10-CM

## 2023-02-09 PROCEDURE — 20610 LARGE JOINT ASPIRATION/INJECTION: R SUBACROMIAL BURSA: ICD-10-PCS | Mod: S$PBB,RT,, | Performed by: FAMILY MEDICINE

## 2023-02-09 PROCEDURE — 99214 OFFICE O/P EST MOD 30 MIN: CPT | Mod: S$PBB,25,, | Performed by: FAMILY MEDICINE

## 2023-02-09 PROCEDURE — 99999 PR PBB SHADOW E&M-EST. PATIENT-LVL IV: CPT | Mod: PBBFAC,,, | Performed by: FAMILY MEDICINE

## 2023-02-09 PROCEDURE — 99214 OFFICE O/P EST MOD 30 MIN: CPT | Mod: PBBFAC | Performed by: FAMILY MEDICINE

## 2023-02-09 PROCEDURE — 99999 PR PBB SHADOW E&M-EST. PATIENT-LVL IV: ICD-10-PCS | Mod: PBBFAC,,, | Performed by: FAMILY MEDICINE

## 2023-02-09 PROCEDURE — 20610 DRAIN/INJ JOINT/BURSA W/O US: CPT | Mod: PBBFAC,RT | Performed by: FAMILY MEDICINE

## 2023-02-09 PROCEDURE — 99214 PR OFFICE/OUTPT VISIT, EST, LEVL IV, 30-39 MIN: ICD-10-PCS | Mod: S$PBB,25,, | Performed by: FAMILY MEDICINE

## 2023-02-09 PROCEDURE — 1159F PR MEDICATION LIST DOCUMENTED IN MEDICAL RECORD: ICD-10-PCS | Mod: CPTII,,, | Performed by: FAMILY MEDICINE

## 2023-02-09 PROCEDURE — 1159F MED LIST DOCD IN RCRD: CPT | Mod: CPTII,,, | Performed by: FAMILY MEDICINE

## 2023-02-09 RX ORDER — CYCLOBENZAPRINE HCL 5 MG
5 TABLET ORAL 3 TIMES DAILY PRN
COMMUNITY

## 2023-02-09 RX ORDER — BETAMETHASONE SODIUM PHOSPHATE AND BETAMETHASONE ACETATE 3; 3 MG/ML; MG/ML
6 INJECTION, SUSPENSION INTRA-ARTICULAR; INTRALESIONAL; INTRAMUSCULAR; SOFT TISSUE
Status: DISCONTINUED | OUTPATIENT
Start: 2023-02-09 | End: 2023-02-09 | Stop reason: HOSPADM

## 2023-02-09 RX ADMIN — BETAMETHASONE SODIUM PHOSPHATE AND BETAMETHASONE ACETATE 6 MG: 3; 3 INJECTION, SUSPENSION INTRA-ARTICULAR; INTRALESIONAL; INTRAMUSCULAR at 01:02

## 2023-02-09 NOTE — PROGRESS NOTES
Subjective:     Patient ID: Gina Schroeder is a 62 y.o. female.    Chief Complaint: Pain of the Right Shoulder      HPI: Patient is here today to follow up for Right shoulder pain. She is right hand dominant. She states that her last injection on 9/28/22 improved her symptoms, but 2 week ago started having pain after being adjusted by her PT for her back pain. She rates her pain today at a 4/10. She states that her pain is worse in the morning with aching and throbbing. She states that her pain is improved with the use of Flexeril and rest. She states use of Flexeril as needed, this is prescribed for her back pain. She denies any current physical therapy for her shoulder.     Patient states that she lost her job and is on Medicaid now and has had difficulty getting into a back clinic to get more assistance with her back pain, for diagnoses which include multilevel spondylosis.    She would like cortisone injection today to the right shoulder.    Past Medical History:   Diagnosis Date    Arthritis     Diabetes mellitus     Heart disease      Past Surgical History:   Procedure Laterality Date    EPIDURAL STEROID INJECTION INTO LUMBAR SPINE      HYSTERECTOMY       Family History   Problem Relation Age of Onset    Breast cancer Maternal Aunt      Social History     Socioeconomic History    Marital status:    Tobacco Use    Smoking status: Never    Smokeless tobacco: Never   Substance and Sexual Activity    Alcohol use: Not Currently    Drug use: Not Currently    Sexual activity: Yes     Partners: Male       Current Outpatient Medications:     aspirin (ECOTRIN) 81 MG EC tablet, 1 tablet, Disp: , Rfl:     buPROPion (WELLBUTRIN XL) 150 MG TB24 tablet, Take 150 mg by mouth once daily., Disp: , Rfl:     cholecalciferol, vitamin D3, (VITAMIN D3) 25 mcg (1,000 unit) capsule, Take 2,000 Units by mouth., Disp: , Rfl:     cyanocobalamin (VITAMIN B-12) 1000 MCG tablet, Take 1,000 mcg by mouth., Disp: , Rfl:      cyclobenzaprine (FLEXERIL) 5 MG tablet, Take 5 mg by mouth 3 (three) times daily as needed for Muscle spasms., Disp: , Rfl:     diazePAM (VALIUM) 5 MG tablet, TAKE ONE TABLET BY MOUTH EVERY 12  HOURS AS NEEDED FOR ANXIETY OR SLEEP, Disp: , Rfl:     diphenhydrAMINE-acetaminophen (TYLENOL PM)  mg Tab, Take 2 tablets by mouth nightly., Disp: , Rfl:     DULoxetine (CYMBALTA) 30 MG capsule, Take 30 mg by mouth once daily., Disp: , Rfl:     empagliflozin (JARDIANCE) 10 mg tablet, Take 1 tablet (10 mg total) by mouth once daily. Please see pharmacy note- provider has new contact information. Please update account information for provider., Disp: 30 tablet, Rfl: 11    estrogens, conjugated, (PREMARIN) 1.25 MG tablet, Take 1 tablet (1.25 mg total) by mouth once daily., Disp: 90 tablet, Rfl: 4    hydroCHLOROthiazide (MICROZIDE) 12.5 mg capsule, Take 12.5 mg by mouth once daily., Disp: , Rfl:     hydrocortisone 2.5 % cream, Apply topically 2 (two) times daily., Disp: 20 g, Rfl: 0    icosapent ethyL (VASCEPA) 1 gram Cap, Take 2 g by mouth., Disp: , Rfl:     insulin (LANTUS SOLOSTAR U-100 INSULIN) glargine 100 units/mL SubQ pen, Inject 34 Units into the skin once daily. **Please update provider's new contact information- no longer with [facility]**, Disp: 15 mL, Rfl: 11    isosorbide mononitrate (IMDUR) 30 MG 24 hr tablet, Take 30 mg by mouth once daily., Disp: , Rfl:     levothyroxine (SYNTHROID) 125 MCG tablet, Take 0.5 tablets by mouth every morning., Disp: , Rfl:     losartan (COZAAR) 100 MG tablet, 1 tablet, Disp: , Rfl:     metFORMIN (GLUCOPHAGE) 1000 MG tablet, Take 1,000 mg by mouth 2 (two) times daily., Disp: , Rfl:     methocarbamoL (ROBAXIN) 750 MG Tab, Take 750 mg by mouth 4 (four) times daily., Disp: , Rfl:     metoprolol tartrate (LOPRESSOR) 25 MG tablet, 1 tablet with food, Disp: , Rfl:     NOVOLOG FLEXPEN U-100 INSULIN 100 unit/mL (3 mL) InPn pen, Inject 5 Units into the skin 3 (three) times daily before meals.  Plus sliding scale if needed. Total daily dose of 30 units, Disp: 15 mL, Rfl: 5    omeprazole (PRILOSEC) 20 MG capsule, 1 capsule, Disp: , Rfl:     orphenadrine (NORFLEX) 100 mg tablet, Take 100 mg by mouth 2 (two) times daily as needed., Disp: , Rfl:     potassium 99 mg Tab, Take by mouth., Disp: , Rfl:     prasugreL (EFFIENT) 10 mg Tab, as directed, Disp: , Rfl:     pregabalin (LYRICA) 50 MG capsule, Take 50 mg by mouth., Disp: , Rfl:     ranolazine (RANEXA) 500 MG Tb12, , Disp: , Rfl:     rosuvastatin (CRESTOR) 20 MG tablet, Take 20 mg by mouth once daily., Disp: , Rfl:     tiZANidine (ZANAFLEX) 4 MG tablet, Take 4 mg by mouth 3 (three) times daily as needed., Disp: , Rfl:     traMADoL (ULTRAM) 50 mg tablet, Take 50 mg by mouth 2 (two) times daily as needed., Disp: , Rfl:     vitamin E 400 UNIT capsule, Take 1 capsule by mouth every morning., Disp: , Rfl:   Review of patient's allergies indicates:  No Known Allergies  Review of Systems   Constitutional:  Negative for chills, fever and weight loss.   Respiratory:  Negative for shortness of breath.    Cardiovascular:  Negative for chest pain and palpitations.     Objective:   There is no height or weight on file to calculate BMI.  There were no vitals filed for this visit.        Ortho/SPM Exam--alert and oriented well-nourished well-developed ambulatory in no acute distress     Respiratory effort appears normal     Right shoulder    Right shoulder exam  No acute deformity.   Tenderness biceps tendon, diffuse anterior shoulder.   Range of motion: 160 Flexion, 90 Abduction, 50 External rotation, Back pocket Internal rotation  Strength: 4/5 Flexion, 4/5 external rotation at 0, 5/5 internal rotation at 0.   Empty can positive  Neers Positive  Liz impingement positive  Speeds positive  Neurovascular intact    Psychiatric good affect and cognition     Plan cortisone injection per patient request but shin smaller dose of steroids since last time she was up all night  "and a little fidgety.  She understands that we can give her information to contact Medicaid to help her find a back specialist to help her.  On her recent A1c was 6.3    Patient Instructions   Apply ice to affected area three times a day for (15) fifteen minutes for the next 48 hours, and reduce activity during that time.  Voltaren gel three times a day to affected area if recommended.  Oral medication if recommended.  Physical therapy if recommended at home or at clinic.  Keep recheck visit. Patient Education       Rotator Cuff Injury   The Basics   Written by the doctors and editors at [facility]   What is a rotator cuff injury? -- A rotator cuff injury is a condition that can cause shoulder pain. The rotator cuff is made up of 4 shoulder muscles and their tendons. Tendons are strong bands of tissue that connect muscles to bones.  People can get different types of rotator cuff injuries. One common injury is "tendinopathy," which is when people have a problem with 1 of their tendons. In most people with tendinopathy, the tendons are not inflamed or swollen. If they do get inflamed or swollen, doctors call it "tendinitis." Tendinopathy and tendinitis can happen if people use their rotator cuff muscles too much or do a lot of activity with their arms overhead.  Another type of rotator cuff injury is a tear in a tendon. Tears can happen if a person falls on the shoulder or moves the shoulder too fast and with too much force. Tears can also happen as a tendon wears out over time.  What are the symptoms of a rotator cuff injury? -- Most people with tendinopathy or tendinitis have pain where the shoulder meets the top of the arm and down the outer part of the upper arm. The pain is usually worse when they move their arm over their head or lie on their shoulder.  People with a torn tendon usually have shoulder pain and might also have trouble raising their arm overhead.  Will I need tests? -- You might. Your doctor or nurse " will talk with you and do an exam. If he or she suspects a tear, you might need an imaging test of the shoulder. Imaging tests create pictures of the inside of the body.  How is a rotator cuff injury treated? -- Many rotator cuff injuries get better on their own, but they can take months to heal completely. To help your shoulder get better, you can:  Rest your shoulder - Avoid doing activities that cause pain or strain your shoulder, such as raising your arm overhead or reaching behind you. In general, try to keep your arm down, close to, and in front of your body. But you can move your shoulder gently if you need to.  Ice your shoulder - Put a cold gel pack, bag of ice, or bag of frozen vegetables on the injured area every 1 to 2 hours, for 15 minutes each time, as needed. Put a thin towel between the ice (or other cold object) and your skin.  Take medicine to reduce the pain and swelling - Your doctor or nurse might recommend that you take ibuprofen (sample brand names: Advil, Motrin) or naproxen (sample brand names: Aleve, Naprosyn).  Some tears, especially large tears, might need to be treated with surgery.  Is there anything I can do on my own to feel better? -- Yes. Different exercises can help your shoulder feel better. Ask your doctor or nurse which exercises you should do, when to start them, and how often to do them.  Some exercises help keep your shoulder from getting too stiff. One of these is called the pendulum stretch. To do this exercise, let your arm relax and hang down. Move your arm back and forth, then side to side, and then around in small circles (figure 1). Your doctor or nurse can also show you other stretches that you can do.  Other exercises can help strengthen your shoulder muscles. Your doctor, nurse, or physical therapist (exercise expert) can show you how to do these types of exercises.  When you do shoulder exercises, it's important to:  Warm up your shoulder first by taking a hot  shower or bath, or massaging the area  Start slowly and make the exercises harder over time  Know that some soreness is normal. If you have sharp or tearing pain, stop what you're doing and let your doctor or nurse know.  What if my symptoms don't get better? -- If your symptoms don't get better, talk with your doctor or nurse about other possible treatments, such as:  Getting a shot of medicine into your shoulder  Surgery  All topics are updated as new evidence becomes available and our peer review process is complete.  This topic retrieved from [employer] on: Sep 21, 2021.  Topic 89999 Version 5.0  Release: 29.4.2 - C29.263  © 2021 UpToDate, Inc. and/or its affiliates. All rights reserved.  figure 1: Pendulum swing     To do this exercise, you can sit or stand. Relax your arm and let it hang down. Move your arm back and forth, then side to side, and then around in small circles in both directions. After about a week, you can make the exercise harder by making bigger movements or holding a weight in your hand.  Graphic 32253 Version 3.0    Consumer Information Use and Disclaimer   This information is not specific medical advice and does not replace information you receive from your health care provider. This is only a brief summary of general information. It does NOT include all information about conditions, illnesses, injuries, tests, procedures, treatments, therapies, discharge instructions or life-style choices that may apply to you. You must talk with your health care provider for complete information about your health and treatment options. This information should not be used to decide whether or not to accept your health care provider's advice, instructions or recommendations. Only your health care provider has the knowledge and training to provide advice that is right for you. The use of this information is governed by the [employer] End User License Agreement, available at  https://www.woltersSakti3uwer.com/en/solutions/lexicomp/about/prieto.The use of [employer] content is governed by the [employer] Terms of Use. ©2021 UpToDate, Inc. All rights reserved.  Copyright   © 2021 UpToDate, Inc. and/or its affiliates. All rights reserved.      IMAGING: X-ray Shoulder 2 or More Views Right  Narrative: EXAMINATION:  XR SHOULDER COMPLETE 2 OR MORE VIEWS RIGHT    CLINICAL HISTORY:  Pain in right shoulder    TECHNIQUE:  Two or three views of the right shoulder were performed.    COMPARISON:  None    FINDINGS:  No acute abnormality with mild AC and glenohumeral joint degenerative findings.  Lung parenchyma clear.  Impression: As above    Electronically signed by: Darrius Grossman MD  Date:    09/28/2022  Time:    13:15       Radiographs / Imaging : Relevant imaging results reviewed by me and interpreted by me, discussed with the patient and / or family -agree with x-ray report      Assessment:     Encounter Diagnosis   Name Primary?    Right shoulder pain, unspecified chronicity Yes        Plan:   Right shoulder pain, unspecified chronicity  -     Large Joint Aspiration/Injection: R subacromial bursa        The patient verbalized good understanding of the medical issues discussed today and expressed appreciation for the care provided.  Patient was given the opportunity to ask questions and be an active participant in their medical care. Patient had no further questions or concerns at this time.     The patient was encouraged to participate in appropriate physical activity.      Fabián Purcell M.D.  [facility]        This note was dictated using voice recognition software and may have sound a like errors.

## 2023-02-09 NOTE — PATIENT INSTRUCTIONS
"Apply ice to affected area three times a day for (15) fifteen minutes for the next 48 hours, and reduce activity during that time.  Voltaren gel three times a day to affected area if recommended.  Oral medication if recommended.  Physical therapy if recommended at home or at clinic.  Keep recheck visit. Patient Education       Rotator Cuff Injury   The Basics   Written by the doctors and editors at [facility]   What is a rotator cuff injury? -- A rotator cuff injury is a condition that can cause shoulder pain. The rotator cuff is made up of 4 shoulder muscles and their tendons. Tendons are strong bands of tissue that connect muscles to bones.  People can get different types of rotator cuff injuries. One common injury is "tendinopathy," which is when people have a problem with 1 of their tendons. In most people with tendinopathy, the tendons are not inflamed or swollen. If they do get inflamed or swollen, doctors call it "tendinitis." Tendinopathy and tendinitis can happen if people use their rotator cuff muscles too much or do a lot of activity with their arms overhead.  Another type of rotator cuff injury is a tear in a tendon. Tears can happen if a person falls on the shoulder or moves the shoulder too fast and with too much force. Tears can also happen as a tendon wears out over time.  What are the symptoms of a rotator cuff injury? -- Most people with tendinopathy or tendinitis have pain where the shoulder meets the top of the arm and down the outer part of the upper arm. The pain is usually worse when they move their arm over their head or lie on their shoulder.  People with a torn tendon usually have shoulder pain and might also have trouble raising their arm overhead.  Will I need tests? -- You might. Your doctor or nurse will talk with you and do an exam. If he or she suspects a tear, you might need an imaging test of the shoulder. Imaging tests create pictures of the inside of the body.  How is a rotator cuff " injury treated? -- Many rotator cuff injuries get better on their own, but they can take months to heal completely. To help your shoulder get better, you can:  Rest your shoulder - Avoid doing activities that cause pain or strain your shoulder, such as raising your arm overhead or reaching behind you. In general, try to keep your arm down, close to, and in front of your body. But you can move your shoulder gently if you need to.  Ice your shoulder - Put a cold gel pack, bag of ice, or bag of frozen vegetables on the injured area every 1 to 2 hours, for 15 minutes each time, as needed. Put a thin towel between the ice (or other cold object) and your skin.  Take medicine to reduce the pain and swelling - Your doctor or nurse might recommend that you take ibuprofen (sample brand names: Advil, Motrin) or naproxen (sample brand names: Aleve, Naprosyn).  Some tears, especially large tears, might need to be treated with surgery.  Is there anything I can do on my own to feel better? -- Yes. Different exercises can help your shoulder feel better. Ask your doctor or nurse which exercises you should do, when to start them, and how often to do them.  Some exercises help keep your shoulder from getting too stiff. One of these is called the pendulum stretch. To do this exercise, let your arm relax and hang down. Move your arm back and forth, then side to side, and then around in small circles (figure 1). Your doctor or nurse can also show you other stretches that you can do.  Other exercises can help strengthen your shoulder muscles. Your doctor, nurse, or physical therapist (exercise expert) can show you how to do these types of exercises.  When you do shoulder exercises, it's important to:  Warm up your shoulder first by taking a hot shower or bath, or massaging the area  Start slowly and make the exercises harder over time  Know that some soreness is normal. If you have sharp or tearing pain, stop what you're doing and let  your doctor or nurse know.  What if my symptoms don't get better? -- If your symptoms don't get better, talk with your doctor or nurse about other possible treatments, such as:  Getting a shot of medicine into your shoulder  Surgery  All topics are updated as new evidence becomes available and our peer review process is complete.  This topic retrieved from [employer] on: Sep 21, 2021.  Topic 53451 Version 5.0  Release: 29.4.2 - C29.263  © 2021 UpToDate, Inc. and/or its affiliates. All rights reserved.  figure 1: Pendulum swing     To do this exercise, you can sit or stand. Relax your arm and let it hang down. Move your arm back and forth, then side to side, and then around in small circles in both directions. After about a week, you can make the exercise harder by making bigger movements or holding a weight in your hand.  Graphic 85365 Version 3.0    Consumer Information Use and Disclaimer   This information is not specific medical advice and does not replace information you receive from your health care provider. This is only a brief summary of general information. It does NOT include all information about conditions, illnesses, injuries, tests, procedures, treatments, therapies, discharge instructions or life-style choices that may apply to you. You must talk with your health care provider for complete information about your health and treatment options. This information should not be used to decide whether or not to accept your health care provider's advice, instructions or recommendations. Only your health care provider has the knowledge and training to provide advice that is right for you. The use of this information is governed by the [employer] End User License Agreement, available at https://www.Lightspeed.MINDBODY/en/solutions/[employer]/about/prieto.The use of [employer] content is governed by the [employer] Terms of Use. ©2021 UpToDate, Inc. All rights reserved.  Copyright   © 2021 UpToDate, Inc. and/or its affiliates.  All rights reserved.

## 2023-02-09 NOTE — PROCEDURES
Large Joint Aspiration/Injection: R subacromial bursa    Date/Time: 2/9/2023 1:00 PM  Performed by: Fabián Purcell MD  Authorized by: Fabián Purcell MD     Consent Done?:  Yes (Verbal)  Indications:  Pain  Site marked: the procedure site was marked    Timeout: prior to procedure the correct patient, procedure, and site was verified    Prep: patient was prepped and draped in usual sterile fashion    Local anesthetic:  Bupivacaine 0.25% without epinephrine and lidocaine 1% without epinephrine  Approach:  Posterior  Location:  Shoulder  Site:  R subacromial bursa  Medications:  6 mg betamethasone acetate-betamethasone sodium phosphate 6 mg/mL  Patient tolerance:  Patient tolerated the procedure well with no immediate complications

## 2023-02-16 ENCOUNTER — PATIENT MESSAGE (OUTPATIENT)
Dept: SPORTS MEDICINE | Facility: CLINIC | Age: 63
End: 2023-02-16
Payer: MEDICAID

## 2023-02-27 ENCOUNTER — TELEPHONE (OUTPATIENT)
Dept: NEUROLOGY | Facility: CLINIC | Age: 63
End: 2023-02-27
Payer: MEDICAID

## 2023-02-27 NOTE — TELEPHONE ENCOUNTER
----- Message from Eugenia Zuniga sent at 2/27/2023  1:37 PM CST -----  Pt would like to check if a referral was received and she would like to schedule an appt. Call back number is [phone]. Thx. EL

## 2023-02-27 NOTE — TELEPHONE ENCOUNTER
Advised patient of our current scheduling access and advised her that we currently do not have any slots open. [facility] and she stated she would reach out to them.

## 2023-04-11 ENCOUNTER — TELEPHONE (OUTPATIENT)
Dept: DIABETES | Facility: CLINIC | Age: 63
End: 2023-04-11
Payer: MEDICAID

## 2023-04-11 NOTE — TELEPHONE ENCOUNTER
----- Message from Indigo Goel MA sent at 4/11/2023 10:51 AM CDT -----  Contact: self  Spoke with pt and she said that she wants to get on Ozempic, Mounjaro, or Wegovy, because she is gaining so much weight and her eating has not change. Pt also said can you look at her recent labs from Dr. Johnson as well and give her a call.   ----- Message -----  From: Sarah Mcnulty  Sent: 4/11/2023  10:11 AM CDT  To: [facility] Staff    Pt is asking for an return call in reference to discuss her blood sugar , please call back at .[phone] Thx CJ

## 2023-04-11 NOTE — TELEPHONE ENCOUNTER
Spoke with pt and informed her of what Amy said below. Pt said that's fine and will call Thursday .              Let patient know Preethi is on vacation, but she will be back in office on Thursday afternoon if she would like to call and ask about starting a new medication. Thanks!

## 2023-04-11 NOTE — TELEPHONE ENCOUNTER
----- Message from SERGIO Mahajan sent at 4/11/2023 11:55 AM CDT -----  Contact: self  Let patient know Preethi is on vacation, but she will be back in office on Thursday afternoon if she would like to call and ask about starting a new medication. Thanks!    ----- Message -----  From: Indigo Goel MA  Sent: 4/11/2023  10:57 AM CDT  To: Preethi St, BETH    Spoke with pt and she said that she wants to get on Ozempic, Mounjaro, or Wegovy, because she is gaining so much weight and her eating has not change. Pt also said can you look at her recent labs from Dr. Johnson as well and give her a call.   ----- Message -----  From: Sarah Mcnulty  Sent: 4/11/2023  10:11 AM CDT  To: [facility] Staff    Pt is asking for an return call in reference to discuss her blood sugar , please call back at .[phone] Thx CJ

## 2023-04-12 ENCOUNTER — TELEPHONE (OUTPATIENT)
Dept: DIABETES | Facility: CLINIC | Age: 63
End: 2023-04-12
Payer: MEDICAID

## 2023-04-13 ENCOUNTER — TELEPHONE (OUTPATIENT)
Dept: NEUROLOGY | Facility: CLINIC | Age: 63
End: 2023-04-13
Payer: MEDICAID

## 2023-04-13 NOTE — TELEPHONE ENCOUNTER
Spoke with patient in regards to scheduling appt information. I advise patient that we currently do not have any openings due to our scheduling access. Patient was advised of the [facility] for medicaid but patient declined number. I did apologizes for the in convince.

## 2023-04-13 NOTE — TELEPHONE ENCOUNTER
----- Message from Tani Bagley sent at 4/13/2023  3:09 PM CDT -----  Contact: Gina  Patient is calling in regards to referral that was faxed over,reports needing to discuss concerns, etc with nurse at soonest convenience please call .[phone]

## 2023-04-18 ENCOUNTER — CLINICAL SUPPORT (OUTPATIENT)
Dept: DIABETES | Facility: CLINIC | Age: 63
End: 2023-04-18
Payer: MEDICAID

## 2023-04-18 ENCOUNTER — TELEPHONE (OUTPATIENT)
Dept: DIABETES | Facility: CLINIC | Age: 63
End: 2023-04-18
Payer: MEDICAID

## 2023-04-18 NOTE — TELEPHONE ENCOUNTER
Returned Ms. Schroeder's call. Let her know it was okay for her to come earlier as long as it would be before 12 and after 1. She states she will be here for 1. Successful call.     ----- Message from Zena Dixon sent at 4/18/2023 10:38 AM CDT -----  Contact: self [phone]  Patient called in this morning stating she has a meter reading for 1:30 and would like to come earlier that that. Please call back [phone]. Thanks cesario

## 2023-04-19 ENCOUNTER — TELEPHONE (OUTPATIENT)
Dept: DIABETES | Facility: CLINIC | Age: 63
End: 2023-04-19
Payer: MEDICAID

## 2023-04-20 ENCOUNTER — OFFICE VISIT (OUTPATIENT)
Dept: DIABETES | Facility: CLINIC | Age: 63
End: 2023-04-20
Payer: MEDICAID

## 2023-04-20 DIAGNOSIS — E11.42 DIABETIC PERIPHERAL NEUROPATHY ASSOCIATED WITH TYPE 2 DIABETES MELLITUS: ICD-10-CM

## 2023-04-20 DIAGNOSIS — Z79.4 CONTROLLED TYPE 2 DIABETES MELLITUS WITH HYPOGLYCEMIA, WITH LONG-TERM CURRENT USE OF INSULIN: Primary | ICD-10-CM

## 2023-04-20 DIAGNOSIS — E03.8 HYPOTHYROIDISM DUE TO HASHIMOTO'S THYROIDITIS: ICD-10-CM

## 2023-04-20 DIAGNOSIS — R42 DIZZINESS: ICD-10-CM

## 2023-04-20 DIAGNOSIS — E06.3 HYPOTHYROIDISM DUE TO HASHIMOTO'S THYROIDITIS: ICD-10-CM

## 2023-04-20 DIAGNOSIS — E11.649 CONTROLLED TYPE 2 DIABETES MELLITUS WITH HYPOGLYCEMIA, WITH LONG-TERM CURRENT USE OF INSULIN: Primary | ICD-10-CM

## 2023-04-20 DIAGNOSIS — G43.909 MIGRAINE WITHOUT STATUS MIGRAINOSUS, NOT INTRACTABLE, UNSPECIFIED MIGRAINE TYPE: ICD-10-CM

## 2023-04-20 PROCEDURE — 1160F RVW MEDS BY RX/DR IN RCRD: CPT | Mod: CPTII,95,, | Performed by: NURSE PRACTITIONER

## 2023-04-20 PROCEDURE — 1159F MED LIST DOCD IN RCRD: CPT | Mod: CPTII,95,, | Performed by: NURSE PRACTITIONER

## 2023-04-20 PROCEDURE — 1160F PR REVIEW ALL MEDS BY PRESCRIBER/CLIN PHARMACIST DOCUMENTED: ICD-10-PCS | Mod: CPTII,95,, | Performed by: NURSE PRACTITIONER

## 2023-04-20 PROCEDURE — 99213 OFFICE O/P EST LOW 20 MIN: CPT | Mod: 95,,, | Performed by: NURSE PRACTITIONER

## 2023-04-20 PROCEDURE — 1159F PR MEDICATION LIST DOCUMENTED IN MEDICAL RECORD: ICD-10-PCS | Mod: CPTII,95,, | Performed by: NURSE PRACTITIONER

## 2023-04-20 PROCEDURE — 4010F PR ACE/ARB THEARPY RXD/TAKEN: ICD-10-PCS | Mod: CPTII,95,, | Performed by: NURSE PRACTITIONER

## 2023-04-20 PROCEDURE — 99213 PR OFFICE/OUTPT VISIT, EST, LEVL III, 20-29 MIN: ICD-10-PCS | Mod: 95,,, | Performed by: NURSE PRACTITIONER

## 2023-04-20 PROCEDURE — 4010F ACE/ARB THERAPY RXD/TAKEN: CPT | Mod: CPTII,95,, | Performed by: NURSE PRACTITIONER

## 2023-04-20 RX ORDER — DULAGLUTIDE 0.75 MG/.5ML
0.75 INJECTION, SOLUTION SUBCUTANEOUS
Qty: 4 PEN | Refills: 2 | Status: SHIPPED | OUTPATIENT
Start: 2023-04-20 | End: 2023-10-19 | Stop reason: SDUPTHER

## 2023-04-20 NOTE — PATIENT INSTRUCTIONS
1. Limit carbs to no more than 30 grams with each meal. Never eat carbs by themselves, always add protein. Make snacks low carb or non-carb only.    2. Continue checking blood sugar 4 times daily: Blood sugar goals should be a fasting blood sugar between , and no blood sugars throughout the day over 180 is good, less than 160 better. Keep a log book and bring with you to all appointments along with your glucose meter.    3. Medications adjusted for today's visit include:    Continue Metformin twice a day.    Continue Jardiance once daily.    Start Trulicity 0.75 mg once a week - we will increase as tolerated.     Continue Lantus 35 units every morning.    Continue Novolog 5 units three times a day before each main meal, plus sliding scale if needed.     160-200: +1 unit  201-240: +2 units  241-280: +3 units  281-320: +4 units  321-360: +5 units    4. Carry glucose tablets with you at all times to treat a low blood sugar episode (less than 70). Chew 4 tablets and re-check blood sugar in 15 minutes. If still low, repeat this. Always call the clinic to give an update for any low blood sugar episodes.    5. Exercise as tolerated.    6. Follow-up for your next visit in 6 months.    7. Please either drop off, fax, or MyChart your readings to me as needed.

## 2023-04-20 NOTE — Clinical Note
Send a message to Neurology to see if she can get a sooner appt for migraines with dizziness. Schedule a 6 month appt with me as diabetes is well controlled. Notes: Dexcom. Have her keep us updated regarding how she is tolerating Trulicity. I will speak with PCP office - have left a message for Loretta to call me back (she was with a patient when I tried to call).

## 2023-04-20 NOTE — PROGRESS NOTES
Established Patient - Audio Only Telehealth Visit     The patient location is: [place]  The chief complaint leading to consultation is: diabetes management follow up   Visit type: Virtual visit with audio only (telephone)  Total time spent with patient: 25 minutes       The reason for the audio only service rather than synchronous audio and video virtual visit was related to technical difficulties or patient preference/necessity.     Each patient to whom I provide medical services by telemedicine is:  (1) informed of the relationship between the physician and patient and the respective role of any other health care provider with respect to management of the patient; and (2) notified that they may decline to receive medical services by telemedicine and may withdraw from such care at any time. Patient verbally consented to receive this service via voice-only telephone call.       HPI: Routine follow up DM management - unable to connect to virtual. Recent A1c 3/29/2023 = 6.0%. Tg level however 324, HDL 38, VLDL 48. . States had lost weight but recently gaining weight, sleeping a lot, and feels swollen. PCP recently adjusted thyroid medication- she admits she was taking her medication with all of her other medicines. We discussed proper way to take Levothyroxine. She verbalized understanding.  Continues to report migraines, dizziness - patient states she is unable to get in with the neurology department.  She would like to start Trulicity. Prior notes in 2020 - patient had reported a history of pancreatitis, however patient states that this was never officially diagnosed, never had blood work. She was just told to stop it when she had some lower back/lower abdominal pain. She would really like to try this again. Frustrated with weight gain and feels miserable. Overall, diabetes control is stable: 77% in range, 18% high, and 5% of readings > 250. Average glucose of 158 mg/dL with estimated GMI 7.1%. Based on  review, agree with trial of Trulicity. She understands the most common side effects and that Trulicity is contraindicated with any h/o pancreatitis, however she is adamant that this was never officially diagnosed and would like to try.        Diabetes Medications               empagliflozin (JARDIANCE) 10 mg tablet Take 1 tablet (10 mg total) by mouth once daily. Please see pharmacy note- provider has new contact information. Please update account information for provider.    insulin (LANTUS SOLOSTAR U-100 INSULIN) glargine 100 units/mL SubQ pen Inject 34 Units into the skin once daily. **Please update provider's new contact information- no longer with [facility]**    metFORMIN (GLUCOPHAGE) 1000 MG tablet Take 1,000 mg by mouth 2 (two) times daily.    NOVOLOG FLEXPEN U-100 INSULIN 100 unit/mL (3 mL) InPn pen Inject 5 Units into the skin 3 (three) times daily before meals. Plus sliding scale if needed. Total daily dose of 30 units           Diabetes Management Status    Statin: Taking  ACE/ARB: Taking    Screening or Prevention Patient's value Goal Complete/Controlled?   HgA1C Testing and Control   Lab Results   Component Value Date    HGBA1C 6.0 (H) 03/29/2023      Annually/Less than 8% Yes     Lipid profile : 03/29/2023 Annually No     LDL control No results found for: LDLCALC Annually/Less than 100 mg/dl  No     Nephropathy screening No results found for: LABMICR  No results found for: PROTEINUA  No results found for: UTPCR   Annually No     Blood pressure BP Readings from Last 1 Encounters:   01/18/23 124/72    Less than 140/90 Yes     Dilated retinal exam : 09/23/2022 Annually Yes     Foot exam   : 06/29/2022 Annually Yes           Assessment and plan:      Gina was seen today for diabetes mellitus.    Diagnoses and all orders for this visit:    Controlled type 2 diabetes mellitus with hypoglycemia, with long-term current use of insulin  -     dulaglutide (TRULICITY) 0.75 mg/0.5 mL pen injector; Inject 0.75 mg into  the skin every 7 days.    Chronic-    Medications adjusted for today's visit include:    Continue Metformin twice a day.    Continue Jardiance once daily.    Start Trulicity 0.75 mg once a week - we will increase as tolerated.     Continue Lantus 35 units every morning.    Continue Novolog 5 units three times a day before each main meal, plus sliding scale if needed.     160-200: +1 unit  201-240: +2 units  241-280: +3 units  281-320: +4 units    Continuous glucose monitoring report:    The patient's CGM was downloaded and was reviewed for the last 14 days. See HPI for interpretation & plan.     Diabetic peripheral neuropathy associated with type 2 diabetes mellitus    Hypothyroidism due to Hashimoto's thyroiditis    Migraine without status migrainosus, not intractable, unspecified migraine type  -     Ambulatory referral/consult to Neurology; Future    Dizziness  -     Ambulatory referral/consult to Neurology; Future                               This service was not originating from a related E/M service provided within the previous 7 days nor will  to an E/M service or procedure within the next 24 hours or my soonest available appointment.  Prevailing standard of care was able to be met in this audio-only visit.

## 2023-04-27 ENCOUNTER — TELEPHONE (OUTPATIENT)
Dept: DIABETES | Facility: CLINIC | Age: 63
End: 2023-04-27
Payer: MEDICAID

## 2023-04-27 NOTE — TELEPHONE ENCOUNTER
Patient called and wondered if she should continue her other medications along with her new added medication (Trulicity). Advise pt to continue all her other medications. Went over her medications with her. She voiced understanding.   Continue Metformin twice a day.  Continue Jardiance once daily.   Start Trulicity 0.75 mg once a week - we will increase as tolerated.   Continue Lantus 35 units every morning.  Continue Novolog 5 units three times a day before each main meal, plus sliding scale if needed.   160-200: +1 unit  201-240: +2 units  241-280: +3 units  281-320: +4 units

## 2023-04-27 NOTE — TELEPHONE ENCOUNTER
----- Message from Johanna Schroeder sent at 4/27/2023  4:16 PM CDT -----  Contact: [phone]  Pt is requesting a call in regards to questions about her insulin shot. Please call pt back at [phone]. Thanks KB

## 2023-05-05 ENCOUNTER — TELEPHONE (OUTPATIENT)
Dept: DIABETES | Facility: CLINIC | Age: 63
End: 2023-05-05
Payer: MEDICAID

## 2023-05-05 NOTE — TELEPHONE ENCOUNTER
Pt was conformed that lost office note was faxed over to [facility] @[phone]

## 2023-05-05 NOTE — TELEPHONE ENCOUNTER
----- Message from Tani Bagley sent at 5/5/2023 10:22 AM CDT -----  Contact: Gina  Patient is requesting a call back, reports needing office to send over dexcom supplies and fax was sent over. Please call patient back at soonest convenience at .[phone]        Thanks

## 2023-07-06 DIAGNOSIS — Z86.010 PERSONAL HISTORY OF COLONIC POLYPS: Primary | ICD-10-CM

## 2023-07-07 ENCOUNTER — HOSPITAL ENCOUNTER (OUTPATIENT)
Dept: PREADMISSION TESTING | Facility: HOSPITAL | Age: 63
Discharge: HOME | End: 2023-07-07
Attending: INTERNAL MEDICINE
Payer: MEDICAID

## 2023-07-07 DIAGNOSIS — Z86.010 PERSONAL HISTORY OF COLONIC POLYPS: ICD-10-CM

## 2023-07-14 ENCOUNTER — TELEPHONE (OUTPATIENT)
Dept: PREADMISSION TESTING | Facility: HOSPITAL | Age: 63
End: 2023-07-14
Payer: MEDICAID

## 2023-07-14 NOTE — TELEPHONE ENCOUNTER
Cardiac Clearanc/Blood thinner clearance for colonoscopy received 7/13/23. Okay to hold Effient x 5 days prior to procedure. Attempted to contact pt to notify and get scheduled. LVM with return number provided.

## 2023-07-18 ENCOUNTER — TELEPHONE (OUTPATIENT)
Dept: PREADMISSION TESTING | Facility: HOSPITAL | Age: 63
End: 2023-07-18
Payer: MEDICAID

## 2023-07-18 DIAGNOSIS — Z86.010 HX OF COLONIC POLYPS: Primary | ICD-10-CM

## 2023-08-23 ENCOUNTER — ANESTHESIA EVENT (OUTPATIENT)
Dept: ENDOSCOPY | Facility: HOSPITAL | Age: 63
End: 2023-08-23
Payer: MEDICAID

## 2023-08-23 ENCOUNTER — ANESTHESIA (OUTPATIENT)
Dept: ENDOSCOPY | Facility: HOSPITAL | Age: 63
End: 2023-08-23
Payer: MEDICAID

## 2023-08-23 ENCOUNTER — HOSPITAL ENCOUNTER (OUTPATIENT)
Facility: HOSPITAL | Age: 63
Discharge: HOME | End: 2023-08-23
Attending: INTERNAL MEDICINE | Admitting: INTERNAL MEDICINE
Payer: MEDICAID

## 2023-08-23 VITALS
SYSTOLIC BLOOD PRESSURE: 115 MMHG | RESPIRATION RATE: 15 BRPM | OXYGEN SATURATION: 97 % | HEART RATE: 65 BPM | TEMPERATURE: 98 F | DIASTOLIC BLOOD PRESSURE: 73 MMHG | HEIGHT: 67 IN | WEIGHT: 179 LBS | BODY MASS INDEX: 28.09 KG/M2

## 2023-08-23 DIAGNOSIS — Z12.11 COLON CANCER SCREENING: Primary | ICD-10-CM

## 2023-08-23 LAB
GLUCOSE SERPL-MCNC: 93 MG/DL (ref 70–110)
POCT GLUCOSE: 93 MG/DL (ref 70–110)

## 2023-08-23 PROCEDURE — 37000009 HC ANESTHESIA EA ADD 15 MINS: Performed by: INTERNAL MEDICINE

## 2023-08-23 PROCEDURE — 25000003 PHARM REV CODE 250: Performed by: NURSE ANESTHETIST, CERTIFIED REGISTERED

## 2023-08-23 PROCEDURE — 45378 DIAGNOSTIC COLONOSCOPY: CPT | Mod: ,,, | Performed by: INTERNAL MEDICINE

## 2023-08-23 PROCEDURE — 37000008 HC ANESTHESIA 1ST 15 MINUTES: Performed by: INTERNAL MEDICINE

## 2023-08-23 PROCEDURE — 63600175 PHARM REV CODE 636 W HCPCS: Performed by: NURSE ANESTHETIST, CERTIFIED REGISTERED

## 2023-08-23 PROCEDURE — 45378 PR COLONOSCOPY,DIAGNOSTIC: ICD-10-PCS | Mod: ,,, | Performed by: INTERNAL MEDICINE

## 2023-08-23 PROCEDURE — 45378 DIAGNOSTIC COLONOSCOPY: CPT | Performed by: INTERNAL MEDICINE

## 2023-08-23 RX ORDER — LIDOCAINE HYDROCHLORIDE 10 MG/ML
INJECTION, SOLUTION EPIDURAL; INFILTRATION; INTRACAUDAL; PERINEURAL
Status: DISCONTINUED | OUTPATIENT
Start: 2023-08-23 | End: 2023-08-23

## 2023-08-23 RX ORDER — PROPOFOL 10 MG/ML
VIAL (ML) INTRAVENOUS
Status: DISCONTINUED | OUTPATIENT
Start: 2023-08-23 | End: 2023-08-23

## 2023-08-23 RX ADMIN — PROPOFOL 30 MG: 10 INJECTION, EMULSION INTRAVENOUS at 10:08

## 2023-08-23 RX ADMIN — LIDOCAINE HYDROCHLORIDE 50 MG: 10 SOLUTION INTRAVENOUS at 10:08

## 2023-08-23 RX ADMIN — PROPOFOL 80 MG: 10 INJECTION, EMULSION INTRAVENOUS at 10:08

## 2023-08-23 RX ADMIN — SODIUM CHLORIDE, SODIUM LACTATE, POTASSIUM CHLORIDE, AND CALCIUM CHLORIDE: .6; .31; .03; .02 INJECTION, SOLUTION INTRAVENOUS at 10:08

## 2023-08-23 NOTE — ANESTHESIA POSTPROCEDURE EVALUATION
Anesthesia Post Evaluation    Patient: Gina Schroeder    Procedure(s) Performed: Procedure(s) (LRB):  COLONOSCOPY - cardiac/anticoagulant clearance in media (N/A)    Final Anesthesia Type: MAC      Patient location during evaluation: PACU  Patient participation: Yes- Able to Participate  Level of consciousness: awake and alert  Post-procedure vital signs: reviewed and stable  Pain management: adequate  Airway patency: patent    PONV status at discharge: No PONV  Anesthetic complications: no      Cardiovascular status: blood pressure returned to baseline  Respiratory status: unassisted and room air  Hydration status: euvolemic  Follow-up not needed.          Vitals Value Taken Time   /73 08/23/23 1047   Temp 36.7 °C (98.1 °F) 08/23/23 1047   Pulse 65 08/23/23 1047   Resp 15 08/23/23 1047   SpO2 97 % 08/23/23 1047         Event Time   Out of Recovery 10:58:07         Pain/David Score: David Score: 9 (8/23/2023 10:47 AM)

## 2023-08-23 NOTE — PLAN OF CARE
Dr. Man  at bedside discussing findings. VSS. Patient alert. No N/V. No bleeding, no pain. Patient released from unit.

## 2023-08-23 NOTE — H&P
PRE PROCEDURE H&P    Patient Name: Gina Schroeder  MRN: [id number]  : 1960  Date of Procedure:  2023  Referring Physician: Sunil Velazquez MD  Primary Physician: Duke Johnson MD  Procedure Physician: Ericka Man MD       Planned Procedure: Colonoscopy  Diagnosis: screening for colon cancer      Chief Complaint: Same as above    HPI: Patient is an 62 y.o. female is here for the above. Here for screening. Has hemorrhoids. Last colonoscopy about 5 years ago. States this is her 3rd colonoscopy. Last two performed by Dr. [facility] at [facility]. No outside records available. No Fhx of CRC. Takes Effient and stopped 5 days ago. Cleared by her CV for today's procedure.    Last colonoscopy:   Family history: none  Anticoagulation: Effient    Past Medical History:   Past Medical History:   Diagnosis Date    Arthritis     Diabetes mellitus     Heart disease         Past Surgical History:  Past Surgical History:   Procedure Laterality Date    EPIDURAL STEROID INJECTION INTO LUMBAR SPINE      HYSTERECTOMY          Home Medications:  Prior to Admission medications    Medication Sig Start Date End Date Taking? Authorizing Provider   aspirin (ECOTRIN) 81 MG EC tablet 1 tablet   Yes Provider, Historical   cholecalciferol, vitamin D3, (VITAMIN D3) 25 mcg (1,000 unit) capsule Take 2,000 Units by mouth.   Yes Provider, Historical   cyanocobalamin (VITAMIN B-12) 1000 MCG tablet Take 1,000 mcg by mouth.   Yes Provider, Historical   cyclobenzaprine (FLEXERIL) 5 MG tablet Take 5 mg by mouth 3 (three) times daily as needed for Muscle spasms.   Yes Provider, Historical   diazePAM (VALIUM) 5 MG tablet TAKE ONE TABLET BY MOUTH EVERY 12  HOURS AS NEEDED FOR ANXIETY OR SLEEP 21  Yes Provider, Historical   diphenhydrAMINE-acetaminophen (TYLENOL PM)  mg Tab Take 2 tablets by mouth nightly.   Yes Provider, Historical   DULoxetine (CYMBALTA) 30 MG capsule Take 30 mg by mouth once daily. 22  Yes Provider, Historical    empagliflozin (JARDIANCE) 10 mg tablet Take 1 tablet (10 mg total) by mouth once daily. Please see pharmacy note- provider has new contact information. Please update account information for provider. 1/12/23  Yes Preethi St NP   estrogens, conjugated, (PREMARIN) 1.25 MG tablet Take 1 tablet (1.25 mg total) by mouth once daily. 1/27/22 8/23/23 Yes Lory Suarez MD   hydroCHLOROthiazide (MICROZIDE) 12.5 mg capsule Take 12.5 mg by mouth once daily. 11/12/21  Yes Provider, Historical   icosapent ethyL (VASCEPA) 1 gram Cap Take 2 g by mouth.   Yes Provider, Historical   insulin (LANTUS SOLOSTAR U-100 INSULIN) glargine 100 units/mL SubQ pen Inject 34 Units into the skin once daily. **Please update provider's new contact information- no longer with [facility]** 9/28/22  Yes Preethi St NP   levothyroxine (SYNTHROID) 125 MCG tablet Take 0.5 tablets by mouth every morning. 11/3/21  Yes Provider, Historical   losartan (COZAAR) 100 MG tablet 1 tablet   Yes Provider, Historical   metFORMIN (GLUCOPHAGE) 1000 MG tablet Take 1,000 mg by mouth 2 (two) times daily. 12/15/21  Yes Provider, Historical   methocarbamoL (ROBAXIN) 750 MG Tab Take 750 mg by mouth 4 (four) times daily. 1/13/23  Yes Provider, Historical   metoprolol tartrate (LOPRESSOR) 25 MG tablet 1 tablet with food 1/11/21  Yes Provider, Historical   NOVOLOG FLEXPEN U-100 INSULIN 100 unit/mL (3 mL) InPn pen Inject 5 Units into the skin 3 (three) times daily before meals. Plus sliding scale if needed. Total daily dose of 30 units 1/25/23  Yes Preethi St NP   omeprazole (PRILOSEC) 20 MG capsule 1 capsule 10/11/21  Yes Provider, Historical   pregabalin (LYRICA) 50 MG capsule Take 50 mg by mouth.   Yes Provider, Historical   rosuvastatin (CRESTOR) 20 MG tablet Take 20 mg by mouth once daily. 1/13/22  Yes Provider, Historical   traMADoL (ULTRAM) 50 mg tablet Take 50 mg by mouth 2 (two) times daily as needed. 9/10/21  Yes Provider, Historical   buPROPion  "(WELLBUTRIN XL) 150 MG TB24 tablet Take 150 mg by mouth once daily. 8/10/22   Provider, Historical   dulaglutide (TRULICITY) 0.75 mg/0.5 mL pen injector Inject 0.75 mg into the skin every 7 days. 4/20/23   Preethi St NP   hydrocortisone 2.5 % cream Apply topically 2 (two) times daily. 1/3/22   Janelle Mar, SAMARIAP   isosorbide mononitrate (IMDUR) 30 MG 24 hr tablet Take 30 mg by mouth once daily. 10/11/21   Provider, Historical   orphenadrine (NORFLEX) 100 mg tablet Take 100 mg by mouth 2 (two) times daily as needed. 12/30/22   Provider, Historical   potassium 99 mg Tab Take by mouth.    Provider, Historical   prasugreL (EFFIENT) 10 mg Tab as directed    Provider, Historical   ranolazine (RANEXA) 500 MG Tb12     Provider, Historical   tiZANidine (ZANAFLEX) 4 MG tablet Take 4 mg by mouth 3 (three) times daily as needed. 9/29/21   Provider, Historical   vitamin E 400 UNIT capsule Take 1 capsule by mouth every morning.    Provider, Historical        Allergies:  Review of patient's allergies indicates:  No Known Allergies     Social History:   Social History     Socioeconomic History    Marital status:    Tobacco Use    Smoking status: Never    Smokeless tobacco: Never   Substance and Sexual Activity    Alcohol use: Not Currently    Drug use: Not Currently    Sexual activity: Yes     Partners: Male       Family History:  Family History   Problem Relation Age of Onset    Breast cancer Maternal Aunt        ROS: No acute cardiac events, no acute respiratory complaints.     Physical Exam (all patients):    BP (!) 143/71 (BP Location: Left arm, Patient Position: Lying)   Pulse 68   Temp 98.2 °F (36.8 °C) (Temporal)   Resp 16   Ht 5' 7" (1.702 m)   Wt 81.2 kg (179 lb)   SpO2 98%   Breastfeeding No   BMI 28.04 kg/m²   Lungs: Clear to auscultation bilaterally, respirations unlabored  Heart: Regular rate and rhythm, S1 and S2 normal, no obvious murmurs  Abdomen:         Soft, non-tender, bowel sounds " normal, no masses, no organomegaly    Lab Results   Component Value Date    WBC 4.9 07/05/2023    RDW 14.4 07/05/2023     07/05/2023    GLU 74 11/17/2021    HGBA1C 6.5 (H) 07/05/2023    BUN 10 11/17/2021     11/17/2021    K 4.1 11/17/2021     11/17/2021        SEDATION PLAN: per anesthesia      History reviewed, vital signs satisfactory, cardiopulmonary status satisfactory, sedation options, risks and plans have been discussed with the patient  All their questions were answered and the patient agrees to the sedation procedures as planned and the patient is deemed an appropriate candidate for the sedation as planned.    The risks, benefits and alternatives of the procedure were discussed with the patient in detail. This discussion was had in the presence of  endoscopy staff. The risks include, risks of adverse reaction to sedation requiring the use of reversal agents, bleeding requiring blood transfusion, perforation requiring surgical intervention and technical failure. Other risks include aspiration leading to respiratory distress and respiratory failure resulting in endotracheal intubation and mechanical ventilation including death. If anesthesia is being utilized for this procedure, it is up to the anesthesiologist to determine airway safety including elective endotracheal intubation. Questions were answered, they agree to proceed. There was no language barriers.      Procedure explained to patient, informed consent obtained and placed in chart.    Ericka Man  8/23/2023  10:14 AM

## 2023-08-23 NOTE — PROVATION PATIENT INSTRUCTIONS
Discharge Summary/Instructions after an Endoscopic Procedure  Patient Name: Gina Schroeder  Patient MRN: [id number]  Patient YOB: 1960  Wednesday, August 23, 2023 Ericka Man MD  Dear patient,  As a result of recent federal legislation (The Federal Cures Act), you may   receive lab or pathology results from your procedure in your [facility]   account before your physician is able to contact you. Your physician or   their representative will relay the results to you with their   recommendations at their soonest availability.  Thank you,  RESTRICTIONS:  During your procedure today, you received medications for sedation.  These   medications may affect your judgment, balance and coordination.  Therefore,   for 24 hours, you have the following restrictions:   - DO NOT drive a car, operate machinery, make legal/financial decisions,   sign important papers or drink alcohol.    ACTIVITY:  Today: no heavy lifting, straining or running due to procedural   sedation/anesthesia.  The following day: return to full activity including work.  DIET:  Eat and drink normally unless instructed otherwise.     TREATMENT FOR COMMON SIDE EFFECTS:  - Mild abdominal pain, nausea, belching, bloating or excessive gas:  rest,   eat lightly and use a heating pad.  - Sore Throat: treat with throat lozenges and/or gargle with warm salt   water.  - Because air was used during the procedure, expelling large amounts of air   from your rectum or belching is normal.  - If a bowel prep was taken, you may not have a bowel movement for 1-3 days.    This is normal.  SYMPTOMS TO WATCH FOR AND REPORT TO YOUR PHYSICIAN:  1. Abdominal pain or bloating, other than gas cramps.  2. Chest pain.  3. Back pain.  4. Signs of infection such as: chills or fever occurring within 24 hours   after the procedure.  5. Rectal bleeding, which would show as bright red, maroon, or black stools.   (A tablespoon of blood from the rectum is not serious, especially if    hemorrhoids are present.)  6. Vomiting.  7. Weakness or dizziness.  GO DIRECTLY TO THE NEAREST EMERGENCY ROOM IF YOU HAVE ANY OF THE FOLLOWING:      Difficulty breathing              Chills and/or fever over 101 F   Persistent vomiting and/or vomiting blood   Severe abdominal pain   Severe chest pain   Black, tarry stools   Bleeding- more than one tablespoon   Any other symptom or condition that you feel may need urgent attention  Your doctor recommends these additional instructions:  If any biopsies were taken, your doctors clinic will contact you in 1 to 2   weeks with any results.  - Discharge patient to home (with escort).   - Resume previous diet.   - Continue present medications.   - Resume Effient (prasugrel) at prior dose today.  Refer to referring   physician for further adjustment of therapy.   - Repeat colonoscopy in 6 years for screening purposes.   - Return to primary care physician.  For questions, problems or results please call your physician Ericka Man MD at Work:  [phone]  If you have any questions about the above instructions, call the GI   department at [phone] or call the endoscopy unit at [phone]   from 7am until 3 pm.  [facility] [place], EMERGENCY ROOM PHONE NUMBER:   [phone]  IF A COMPLICATION OR EMERGENCY SITUATION ARISES AND YOU ARE UNABLE TO REACH   YOUR PHYSICIAN - GO DIRECTLY TO THE EMERGENCY ROOM.  I have read or have had read to me these discharge instructions for my   procedure and have received a written copy.  I understand these   instructions and will follow-up with my physician if I have any questions.     __________________________________       _____________________________________  Nurse Signature                                          Patient/Designated   Responsible Party Signature  MD Ericka Drake MD  8/23/2023 10:41:54 AM  This report has been verified and signed electronically.  Dear patient,  As a  result of recent federal legislation (The Federal Cures Act), you may   receive lab or pathology results from your procedure in your [facility]   account before your physician is able to contact you. Your physician or   their representative will relay the results to you with their   recommendations at their soonest availability.  Thank you,  PROVATION

## 2023-08-23 NOTE — ANESTHESIA PREPROCEDURE EVALUATION
08/23/2023  Gina Schroeder is a 62 y.o., female.      Pre-op Assessment    I have reviewed the Patient Summary Reports.     I have reviewed the Nursing Notes. I have reviewed the NPO Status.   I have reviewed the Medications.     Review of Systems  Anesthesia Hx:  No problems with previous Anesthesia  Denies Family Hx of Anesthesia complications.   Denies Personal Hx of Anesthesia complications.   Social:  Non-Smoker    Hematology/Oncology:  Hematology Normal   Oncology Normal     EENT/Dental:EENT/Dental Normal   Cardiovascular:   CAD   hyperlipidemia ECG has been reviewed.    Pulmonary:  Pulmonary Normal    Renal/:  Renal/ Normal     Hepatic/GI:  Hepatic/GI Normal    Musculoskeletal:   Arthritis     Neurological:   Neuromuscular Disease, Headaches    Endocrine:   Diabetes, type 2 Hypothyroidism    Dermatological:  Skin Normal    Psych:   Psychiatric History          Physical Exam  General: Cooperative, Alert, Oriented and Well nourished    Airway:  Mallampati: II   Mouth Opening: Normal  TM Distance: Normal  Tongue: Normal  Neck ROM: Normal ROM    Chest/Lungs:  Clear to auscultation, Normal Respiratory Rate    Heart:  Rate: Normal  Rhythm: Regular Rhythm        Anesthesia Plan  Type of Anesthesia, risks & benefits discussed:    Anesthesia Type: MAC  Intra-op Monitoring Plan: Standard ASA Monitors  Induction:  IV  Informed Consent: Informed consent signed with the Patient and all parties understand the risks and agree with anesthesia plan.  All questions answered.   ASA Score: 3  Day of Surgery Review of History & Physical: H&P Update referred to the surgeon/provider.I have interviewed and examined the patient. I have reviewed the patient's H&P dated: There are no significant changes.     Ready For Surgery From Anesthesia Perspective.     .

## 2023-08-23 NOTE — TRANSFER OF CARE
"Anesthesia Transfer of Care Note    Patient: Gina Schroeder    Procedure(s) Performed: Procedure(s) (LRB):  COLONOSCOPY - cardiac/anticoagulant clearance in media (N/A)    Patient location: PACU    Anesthesia Type: MAC    Transport from OR: Transported from OR on room air with adequate spontaneous ventilation    Post pain: adequate analgesia    Post assessment: no apparent anesthetic complications    Post vital signs: stable    Level of consciousness: responds to stimulation    Nausea/Vomiting: no nausea/vomiting    Complications: none    Transfer of care protocol was followed      Last vitals:   Visit Vitals  BP (!) 143/71 (BP Location: Left arm, Patient Position: Lying)   Pulse 68   Temp 36.8 °C (98.2 °F) (Temporal)   Resp 16   Ht 5' 7" (1.702 m)   Wt 81.2 kg (179 lb)   SpO2 98%   Breastfeeding No   BMI 28.04 kg/m²     "

## 2023-10-19 DIAGNOSIS — Z79.4 CONTROLLED TYPE 2 DIABETES MELLITUS WITH HYPOGLYCEMIA, WITH LONG-TERM CURRENT USE OF INSULIN: ICD-10-CM

## 2023-10-19 DIAGNOSIS — E11.649 CONTROLLED TYPE 2 DIABETES MELLITUS WITH HYPOGLYCEMIA, WITH LONG-TERM CURRENT USE OF INSULIN: ICD-10-CM

## 2023-10-19 RX ORDER — DULAGLUTIDE 0.75 MG/.5ML
0.75 INJECTION, SOLUTION SUBCUTANEOUS
Qty: 4 PEN | Refills: 11 | Status: SHIPPED | OUTPATIENT
Start: 2023-10-19 | End: 2023-10-24

## 2023-10-19 RX ORDER — INSULIN GLARGINE 100 [IU]/ML
34 INJECTION, SOLUTION SUBCUTANEOUS DAILY
Qty: 15 ML | Refills: 11 | Status: SHIPPED | OUTPATIENT
Start: 2023-10-19

## 2023-10-19 NOTE — TELEPHONE ENCOUNTER
----- Message from Deisy Wyatt sent at 10/19/2023  2:51 PM CDT -----  Contact: Gina  .Type:  RX Refill Request    Who Called: Gina  Refill or New Rx:Refill  RX Name and Strength:1.dulaglutide (TRULICITY) 0.75 mg/0.5 mL pen injector  2.insulin (LANTUS SOLOSTAR U-100 INSULIN) glargine 100 units/mL SubQ pen  How is the patient currently taking it? (ex. 1XDay):as prescribed   Is this a 30 day or 90 day RX:30  Preferred Pharmacy with phone number:    [facility] - [place] [id number] [address]  Phone: [phone] Fax: [phone]      Local or Mail Order:local   Ordering Provider:   Would the patient rather a call back or a response via [facility]? Demetrius vaughn   [facility] Call Back Number:[phone]   Additional Information:

## 2023-10-24 ENCOUNTER — OFFICE VISIT (OUTPATIENT)
Dept: DIABETES | Facility: CLINIC | Age: 63
End: 2023-10-24
Payer: MEDICAID

## 2023-10-24 DIAGNOSIS — Z79.4 CONTROLLED TYPE 2 DIABETES MELLITUS WITH HYPOGLYCEMIA, WITH LONG-TERM CURRENT USE OF INSULIN: Primary | ICD-10-CM

## 2023-10-24 DIAGNOSIS — E11.649 CONTROLLED TYPE 2 DIABETES MELLITUS WITH HYPOGLYCEMIA, WITH LONG-TERM CURRENT USE OF INSULIN: ICD-10-CM

## 2023-10-24 DIAGNOSIS — E11.42 DIABETIC PERIPHERAL NEUROPATHY ASSOCIATED WITH TYPE 2 DIABETES MELLITUS: ICD-10-CM

## 2023-10-24 DIAGNOSIS — E03.8 HYPOTHYROIDISM DUE TO HASHIMOTO'S THYROIDITIS: ICD-10-CM

## 2023-10-24 DIAGNOSIS — Z79.4 CONTROLLED TYPE 2 DIABETES MELLITUS WITH HYPOGLYCEMIA, WITH LONG-TERM CURRENT USE OF INSULIN: ICD-10-CM

## 2023-10-24 DIAGNOSIS — E06.3 HYPOTHYROIDISM DUE TO HASHIMOTO'S THYROIDITIS: ICD-10-CM

## 2023-10-24 DIAGNOSIS — E11.649 CONTROLLED TYPE 2 DIABETES MELLITUS WITH HYPOGLYCEMIA, WITH LONG-TERM CURRENT USE OF INSULIN: Primary | ICD-10-CM

## 2023-10-24 PROCEDURE — 1160F PR REVIEW ALL MEDS BY PRESCRIBER/CLIN PHARMACIST DOCUMENTED: ICD-10-PCS | Mod: CPTII,95,, | Performed by: NURSE PRACTITIONER

## 2023-10-24 PROCEDURE — 1160F RVW MEDS BY RX/DR IN RCRD: CPT | Mod: CPTII,95,, | Performed by: NURSE PRACTITIONER

## 2023-10-24 PROCEDURE — 99442 PR PHYSICIAN TELEPHONE EVALUATION 11-20 MIN: CPT | Mod: 95,,, | Performed by: NURSE PRACTITIONER

## 2023-10-24 PROCEDURE — 99442 PR PHYSICIAN TELEPHONE EVALUATION 11-20 MIN: ICD-10-PCS | Mod: 95,,, | Performed by: NURSE PRACTITIONER

## 2023-10-24 PROCEDURE — 1159F MED LIST DOCD IN RCRD: CPT | Mod: CPTII,95,, | Performed by: NURSE PRACTITIONER

## 2023-10-24 PROCEDURE — 1159F PR MEDICATION LIST DOCUMENTED IN MEDICAL RECORD: ICD-10-PCS | Mod: CPTII,95,, | Performed by: NURSE PRACTITIONER

## 2023-10-24 PROCEDURE — 4010F ACE/ARB THERAPY RXD/TAKEN: CPT | Mod: CPTII,95,, | Performed by: NURSE PRACTITIONER

## 2023-10-24 PROCEDURE — 3044F HG A1C LEVEL LT 7.0%: CPT | Mod: CPTII,95,, | Performed by: NURSE PRACTITIONER

## 2023-10-24 PROCEDURE — 3044F PR MOST RECENT HEMOGLOBIN A1C LEVEL <7.0%: ICD-10-PCS | Mod: CPTII,95,, | Performed by: NURSE PRACTITIONER

## 2023-10-24 PROCEDURE — 4010F PR ACE/ARB THEARPY RXD/TAKEN: ICD-10-PCS | Mod: CPTII,95,, | Performed by: NURSE PRACTITIONER

## 2023-10-24 RX ORDER — DULAGLUTIDE 1.5 MG/.5ML
1.5 INJECTION, SOLUTION SUBCUTANEOUS
Qty: 4 PEN | Refills: 5 | Status: SHIPPED | OUTPATIENT
Start: 2023-10-24

## 2023-10-24 NOTE — PATIENT INSTRUCTIONS
1. Limit carbs to no more than 30 grams with each meal. Never eat carbs by themselves, always add protein. Make snacks low carb or non-carb only.    2. Continue checking blood sugar 4 times daily: Blood sugar goals should be a fasting blood sugar between , and no blood sugars throughout the day over 180 is good, less than 160 better. Keep a log book and bring with you to all appointments along with your glucose meter.    3. Medications adjusted for today's visit include:    Continue Metformin twice a day.    Continue Jardiance once daily.    Increase Trulicity to 1.5 mg weekly.     Continue Lantus 40 units every morning.    Continue Novolog 5 units three times a day before each main meal, plus sliding scale if needed.     160-200: +1 unit  201-240: +2 units  241-280: +3 units  281-320: +4 units  321-360: +5 units    4. Carry glucose tablets with you at all times to treat a low blood sugar episode (less than 70). Chew 4 tablets and re-check blood sugar in 15 minutes. If still low, repeat this. Always call the clinic to give an update for any low blood sugar episodes.    5. Exercise as tolerated.    6. Follow-up for your next visit in 6 months.    7. Please either drop off, fax, or MyChart your readings to me as needed.

## 2023-10-24 NOTE — TELEPHONE ENCOUNTER
----- Message from Wilma Butler sent at 10/24/2023  2:15 PM CDT -----  Contact: Gina Mcmullen forgot to mention the medication empagliflozin (JARDIANCE) 10 mg tablet needs to be refilled. Please send to [facility] in [place].     Thanks  TS

## 2023-10-24 NOTE — PROGRESS NOTES
Established Patient - Audio Only Telehealth Visit     The patient location is: [place]  The chief complaint leading to consultation is: diabetes management routine follow up   Visit type: Virtual visit with audio only (telephone)  Total time spent with patient: 15 minutes       The reason for the audio only service rather than synchronous audio and video virtual visit was related to technical difficulties or patient preference/necessity.     Each patient to whom I provide medical services by telemedicine is:  (1) informed of the relationship between the physician and patient and the respective role of any other health care provider with respect to management of the patient; and (2) notified that they may decline to receive medical services by telemedicine and may withdraw from such care at any time. Patient verbally consented to receive this service via voice-only telephone call.    HPI:     I do not have her Dexcom download for this visit. At last visit, she was initiated on Trulicity -no GI side effects. She reports slow weight loss. Reports weight at PCP office recently 170 lbs. Not requiring much Novolog during the day since start of Trulicity. Unable to exercise.  Reviewed labs at time of audio visit - completed with PCP 10/4/2023.    A1c remains at goal.     Now seeing pain management, Dr. Dickerson. Diagnosed with degenerative lumbar disc disease. Also reports now has bone spurs. She had steroid injections 9/25 -minimal relief. Currently very limited with ROM - stays in significant pain. PCP adjusted muscle relaxers which has helped a little.    Dyslipidemia- Takes Crestor and a fish oil twice a day. Triglycerides are much improved.    Diabetes Medications               dulaglutide (TRULICITY) 0.75 mg/0.5 mL pen injector Inject 0.75 mg into the skin every 7 days. Plan to increase today    empagliflozin (JARDIANCE) 10 mg tablet Take 1 tablet (10 mg total) by mouth once daily. Please see pharmacy note- provider  "has new contact information. Please update account information for provider.    insulin (LANTUS SOLOSTAR U-100 INSULIN) glargine 100 units/mL SubQ pen Inject 34 Units into the skin once daily. Taking 40 units     metFORMIN (GLUCOPHAGE) 1000 MG tablet Take 1,000 mg by mouth 2 (two) times daily.    NOVOLOG FLEXPEN U-100 INSULIN 100 unit/mL (3 mL) InPn pen Inject 5 Units into the skin 3 (three) times daily before meals. Plus sliding scale if needed. Total daily dose of 30 units           Diabetes Management Status    Statin: Taking  ACE/ARB: Taking    Screening or Prevention Patient's value Goal Complete/Controlled?   HgA1C Testing and Control   Lab Results   Component Value Date    HGBA1C 6.4 (H) 10/04/2023      Annually/Less than 8% Yes   Lipid profile : 10/04/2023 Annually No   LDL control Lab Results   Component Value Date    LDLCALC 28 10/04/2023    Annually/Less than 100 mg/dl  Yes   Nephropathy screening No results found for: "LABMICR"  No results found for: "PROTEINUA"  No results found for: "UTPCR"   Annually No   Blood pressure BP Readings from Last 1 Encounters:   08/23/23 115/73    Less than 140/90 Yes   Dilated retinal exam : 09/23/2022 Annually No   Foot exam   : 06/29/2022 Annually No Deferred-audio visit         Assessment and plan:      Gina was seen today for diabetes mellitus.    Diagnoses and all orders for this visit:    Controlled type 2 diabetes mellitus with hypoglycemia, with long-term current use of insulin  -     dulaglutide (TRULICITY) 1.5 mg/0.5 mL pen injector; Inject 1.5 mg into the skin every 7 days.    Chronic,stable -     Continue Metformin twice a day.    Continue Jardiance once daily.    Increase Trulicity to 1.5 mg weekly.     Continue Lantus 40 units every morning.    Continue Novolog 5 units three times a day before each main meal, plus sliding scale if needed.     160-200: +1 unit  201-240: +2 units  241-280: +3 units  281-320: +4 units  321-360: +5 units    Diabetic peripheral " neuropathy associated with type 2 diabetes mellitus    Hypothyroidism due to Hashimoto's thyroiditis                              This service was not originating from a related E/M service provided within the previous 7 days nor will  to an E/M service or procedure within the next 24 hours or my soonest available appointment.  Prevailing standard of care was able to be met in this audio-only visit.

## 2023-11-22 ENCOUNTER — TELEPHONE (OUTPATIENT)
Dept: DIABETES | Facility: CLINIC | Age: 63
End: 2023-11-22
Payer: MEDICAID

## 2023-11-22 NOTE — PATIENT INSTRUCTIONS
Labs, fasting when able. Call if you do not hear results within 1 week.    1. Limit carbs to no more than 30 grams with each meal. Never eat carbs by themselves, always add protein. Make snacks low carb or non-carb only.    2. Continue checking blood sugar 4 times daily: Blood sugar goals should be a fasting blood sugar between , and no blood sugars throughout the day over 180 is good, less than 160 better. Keep a log book and bring with you to all appointments along with your glucose meter.    3. Medications adjusted for today's visit include:  Continue Metformin twice a day.    Continue Jardiance once daily.    Continue Lantus 44 units every morning.    Continue Novolog per sliding scale:    160-200: +1 unit  201-240: +2 units  241-280: +3 units  281-320: +4 units  321-360: +5 units    4. Carry glucose tablets with you at all times to treat a low blood sugar episode (less than 70). Chew 4 tablets and re-check blood sugar in 15 minutes. If still low, repeat this. Always call the clinic to give an update for any low blood sugar episodes.    5. Exercise as tolerated.    6. Follow-up for your next visit in 3 months.    7. Please either drop off, fax, or MyChart your readings to me as needed.         Patient came for nurse visit to provide a urine sample.  Patient unable to urinate in a specimen cup.  Urine sample collected via straight cath.  UA positive for blood, nitrites and moderate leukocytes.  Urine culture collected and sent out.    Per telephone encounter with MD, pt may be started in cipro 250mg, BID x 7 days.  This is noted below.  RX sent in for patient.

## 2023-11-22 NOTE — TELEPHONE ENCOUNTER
Received request for DM&S for dexcom supplies. Form completed and faxed back with chart notes. Form uploaded to Media.

## 2023-12-12 ENCOUNTER — TELEPHONE (OUTPATIENT)
Dept: DIABETES | Facility: CLINIC | Age: 63
End: 2023-12-12
Payer: MEDICAID

## 2023-12-12 DIAGNOSIS — E11.649 CONTROLLED TYPE 2 DIABETES MELLITUS WITH HYPOGLYCEMIA, WITH LONG-TERM CURRENT USE OF INSULIN: ICD-10-CM

## 2023-12-12 DIAGNOSIS — Z79.4 CONTROLLED TYPE 2 DIABETES MELLITUS WITH HYPOGLYCEMIA, WITH LONG-TERM CURRENT USE OF INSULIN: Primary | ICD-10-CM

## 2023-12-12 DIAGNOSIS — Z79.4 CONTROLLED TYPE 2 DIABETES MELLITUS WITH HYPOGLYCEMIA, WITH LONG-TERM CURRENT USE OF INSULIN: ICD-10-CM

## 2023-12-12 DIAGNOSIS — E11.649 CONTROLLED TYPE 2 DIABETES MELLITUS WITH HYPOGLYCEMIA, WITH LONG-TERM CURRENT USE OF INSULIN: Primary | ICD-10-CM

## 2023-12-12 RX ORDER — BLOOD-GLUCOSE TRANSMITTER
EACH MISCELLANEOUS
Qty: 1 EACH | Refills: 3 | Status: SHIPPED | OUTPATIENT
Start: 2023-12-12

## 2023-12-12 RX ORDER — BLOOD-GLUCOSE SENSOR
1 EACH MISCELLANEOUS
Qty: 3 EACH | Refills: 11 | Status: SHIPPED | OUTPATIENT
Start: 2023-12-12

## 2023-12-12 RX ORDER — BLOOD-GLUCOSE SENSOR
1 EACH MISCELLANEOUS
Qty: 3 EACH | Refills: 11 | Status: SHIPPED | OUTPATIENT
Start: 2023-12-12 | End: 2023-12-12 | Stop reason: SDUPTHER

## 2023-12-12 NOTE — TELEPHONE ENCOUNTER
Dexcom G6 transmitter approved until 6/9/24 1 per 90 days. PA# [id number]. Dexcom G6 sensors approved until 6/9/24 3 per 30 days. PA# [id number]. Per Patricia Perez

## 2023-12-12 NOTE — TELEPHONE ENCOUNTER
----- Message from Colleen Alfred LPN sent at 12/12/2023  7:52 AM CST -----  Contact: Gina  It just has to be sent to her retail pharmacy, she has medicaid. She can no longer get it through DME    ----- Message -----  From: Preethi St NP  Sent: 12/11/2023  11:51 AM CST  To: Colleen Alfred LPN    Is this one that just needs a PA?    ----- Message -----  From: Nick Veronica MA  Sent: 12/11/2023  11:23 AM CST  To: Preethi St NP      ----- Message -----  From: Aman Pedraza  Sent: 12/11/2023  11:11 AM CST  To: [facility] Staff    Pt is calling in regards to getting refill alternative for Dex COM, due to insurance not covering it anymore. Please call back at .[phone].      [facility] - [place] [address] [address]  Phone: [phone] Fax: [phone]          Thanks  KAYLEN

## 2023-12-12 NOTE — TELEPHONE ENCOUNTER
Please let patient know that I sent her Dexcom prescriptions to [facility]'s pharmacy. Dexcom now has to go through pharmacy and not a DME company on her current insurance. If they are unable to provide Dexcom, we can switch to [facility] who could ship to her if she wanted.

## 2024-05-30 ENCOUNTER — TELEPHONE (OUTPATIENT)
Dept: DIABETES | Facility: CLINIC | Age: 64
End: 2024-05-30
Payer: MEDICAID

## 2024-05-30 NOTE — TELEPHONE ENCOUNTER
---Patient called voiced that Jardiance was discontinued by Dr. Johnson due to rash, UTI's are recurrent due to being off of Jardiance. Patient inquiring what to do regarding medication or alternative that insurance will cover. Please advise       -- Message from Sindy Quiroga MA sent at 5/29/2024  4:00 PM CDT -----  Type:  Needs Medical Advice/Symptom-based Call    Who Called:  Pt   Symptoms (please be specific):  Uti came back because she was tooken off Jardance due to rash break outs   How long has patient had these symptoms:      Pharmacy:    Would the patient rather a call back or a response via [facility]?    Best Call Back Number:  [phone]  Additional Information:   Asking for a call with a alternative that insurance will cover

## 2024-05-30 NOTE — TELEPHONE ENCOUNTER
-Patient called and voiced that it has been a couple months without Jardiance.    Blood sugars have been normal occassional elevations to 180 mg/dl     Medication reviewed with patient and sent in message. Patient voiced that she want to get back on Jardiance. Patient offered Nurse visit for Urine culture patient voice MOON was to send culture           ---- Message from Preethi St NP sent at 5/30/2024 11:18 AM CDT -----  Noted. Can you get an update with how blood sugars are trending without Jardiance? How long has she been off?    These are the medications she should currently be taking: Tell her the best option would be to increase Trulicity if she is tolerating well. The other alternatives to Jardiance would also cause urinary tract or yeast infections.     Metformin twice a day.    Trulicity to 1.5 mg weekly.      Lantus 40 units every morning.    Novolog 5 units three times a day before each main meal, plus sliding scale if needed.     160-200: +1 unit  201-240: +2 units  241-280: +3 units  281-320: +4 units  321-360: +5 units  ----- Message -----  From: Abdirahman Diaz LPN  Sent: 5/30/2024  11:17 AM CDT  To: Preethi St NP    Patient called voiced that Jardiance was discontinued by Dr. Johnson due to rash, UTI's are recurrent due to being off of Jardiance. Patient inquiring what to do regarding medication or alternative that insurance will cover. Please advise  ----- Message -----  From: Sindy Quiroga MA  Sent: 5/29/2024   4:02 PM CDT  To: [facility] Staff    Type:  Needs Medical Advice/Symptom-based Call    Who Called:  Pt   Symptoms (please be specific):  Uti came back because she was tooken off Jardance due to rash break outs   How long has patient had these symptoms:      Pharmacy:    Would the patient rather a call back or a response via [facility]?    Best Call Back Number:  [phone]  Additional Information:   Asking for a call with a alternative that insurance will cover

## 2024-07-17 ENCOUNTER — TELEPHONE (OUTPATIENT)
Dept: DIABETES | Facility: CLINIC | Age: 64
End: 2024-07-17
Payer: MEDICAID

## 2024-07-17 NOTE — TELEPHONE ENCOUNTER
Patient called to schedule appointment with shaji. Patient scheduled next available, voiced understanding of appointment time and date.  ----- Message from Radha Lugo sent at 7/17/2024  1:46 PM CDT -----  Contact: Gina Fuentes is needing a call back to reschedule her upcoming appointment on 8/7. Please call back at [phone].     Thank you summer

## 2024-08-08 DIAGNOSIS — Z79.4 CONTROLLED TYPE 2 DIABETES MELLITUS WITH HYPOGLYCEMIA, WITH LONG-TERM CURRENT USE OF INSULIN: ICD-10-CM

## 2024-08-08 DIAGNOSIS — E11.649 CONTROLLED TYPE 2 DIABETES MELLITUS WITH HYPOGLYCEMIA, WITH LONG-TERM CURRENT USE OF INSULIN: ICD-10-CM

## 2024-08-08 RX ORDER — INSULIN GLARGINE 100 [IU]/ML
34 INJECTION, SOLUTION SUBCUTANEOUS DAILY
Qty: 15 ML | Refills: 11 | Status: SHIPPED | OUTPATIENT
Start: 2024-08-08

## 2024-08-14 ENCOUNTER — OFFICE VISIT (OUTPATIENT)
Dept: DIABETES | Facility: CLINIC | Age: 64
End: 2024-08-14
Payer: MEDICAID

## 2024-08-14 DIAGNOSIS — E11.69 HYPERLIPIDEMIA ASSOCIATED WITH TYPE 2 DIABETES MELLITUS: ICD-10-CM

## 2024-08-14 DIAGNOSIS — E11.42 DIABETIC PERIPHERAL NEUROPATHY ASSOCIATED WITH TYPE 2 DIABETES MELLITUS: ICD-10-CM

## 2024-08-14 DIAGNOSIS — I25.10 CORONARY ARTERY DISEASE, UNSPECIFIED VESSEL OR LESION TYPE, UNSPECIFIED WHETHER ANGINA PRESENT, UNSPECIFIED WHETHER NATIVE OR TRANSPLANTED HEART: ICD-10-CM

## 2024-08-14 DIAGNOSIS — E06.3 HYPOTHYROIDISM DUE TO HASHIMOTO'S THYROIDITIS: ICD-10-CM

## 2024-08-14 DIAGNOSIS — E03.8 HYPOTHYROIDISM DUE TO HASHIMOTO'S THYROIDITIS: ICD-10-CM

## 2024-08-14 DIAGNOSIS — E78.5 HYPERLIPIDEMIA ASSOCIATED WITH TYPE 2 DIABETES MELLITUS: ICD-10-CM

## 2024-08-14 DIAGNOSIS — E11.649 CONTROLLED TYPE 2 DIABETES MELLITUS WITH HYPOGLYCEMIA, WITH LONG-TERM CURRENT USE OF INSULIN: Primary | ICD-10-CM

## 2024-08-14 DIAGNOSIS — Z79.4 CONTROLLED TYPE 2 DIABETES MELLITUS WITH HYPOGLYCEMIA, WITH LONG-TERM CURRENT USE OF INSULIN: Primary | ICD-10-CM

## 2024-08-14 PROCEDURE — 99214 OFFICE O/P EST MOD 30 MIN: CPT | Mod: 95,,, | Performed by: NURSE PRACTITIONER

## 2024-08-14 PROCEDURE — 1160F RVW MEDS BY RX/DR IN RCRD: CPT | Mod: CPTII,95,, | Performed by: NURSE PRACTITIONER

## 2024-08-14 PROCEDURE — 1159F MED LIST DOCD IN RCRD: CPT | Mod: CPTII,95,, | Performed by: NURSE PRACTITIONER

## 2024-08-14 PROCEDURE — 3044F HG A1C LEVEL LT 7.0%: CPT | Mod: CPTII,95,, | Performed by: NURSE PRACTITIONER

## 2024-08-14 PROCEDURE — 4010F ACE/ARB THERAPY RXD/TAKEN: CPT | Mod: CPTII,95,, | Performed by: NURSE PRACTITIONER

## 2024-08-14 RX ORDER — BLOOD-GLUCOSE TRANSMITTER
EACH MISCELLANEOUS
Qty: 1 EACH | Refills: 3 | Status: SHIPPED | OUTPATIENT
Start: 2024-08-14

## 2024-08-14 RX ORDER — INSULIN GLARGINE 100 [IU]/ML
40 INJECTION, SOLUTION SUBCUTANEOUS DAILY
Qty: 15 ML | Refills: 11 | Status: SHIPPED | OUTPATIENT
Start: 2024-08-14

## 2024-08-14 RX ORDER — BLOOD-GLUCOSE SENSOR
1 EACH MISCELLANEOUS
Qty: 3 EACH | Refills: 11 | Status: SHIPPED | OUTPATIENT
Start: 2024-08-14

## 2024-08-14 NOTE — Clinical Note
Please call patient. Let her know unfortunately our dermatology dept is not accepting her insurance at this time so I will speak with Loretta as we discussed. She needs a 6 month follow up visit with me. Notes: Dexcom G6-

## 2024-08-14 NOTE — PROGRESS NOTES
The patient location is: [place]  The chief complaint leading to consultation is: Diabetes management follow up     Visit type: audiovisual    Face to Face time with patient: 12 minutes  20 minutes of total time spent on the encounter, which includes face to face time and non-face to face time preparing to see the patient (eg, review of tests), Obtaining and/or reviewing separately obtained history, Documenting clinical information in the electronic or other health record, Independently interpreting results (not separately reported) and communicating results to the patient/family/caregiver, or Care coordination (not separately reported).         Each patient to whom he or she provides medical services by telemedicine is:  (1) informed of the relationship between the physician and patient and the respective role of any other health care provider with respect to management of the patient; and (2) notified that he or she may decline to receive medical services by telemedicine and may withdraw from such care at any time.    Notes:      Subjective:         Patient ID: Gina Schroeder is a 63 y.o. female.  Patient's current PCP is Duke Johnson MD.     Chief Complaint: Diabetes Mellitus    HPI  Gina Schroeder is a 63 y.o. White female presenting for a follow up for diabetes. Patient has been diagnosed with type 2 diabetes since 2010 .    CURRENT DM MEDICATIONS:   Diabetes Medications               dulaglutide (TRULICITY) 1.5 mg/0.5 mL pen injector Inject 1.5 mg into the skin every 7 days.    empagliflozin (JARDIANCE) 10 mg tablet Take 1 tablet (10 mg total) by mouth once daily. No longer taking    insulin glargine U-100, Lantus, (LANTUS SOLOSTAR U-100 INSULIN) 100 unit/mL (3 mL) InPn pen Inject 34 Units into the skin once daily. 40 units AM -- out x3 days    metFORMIN (GLUCOPHAGE) 1000 MG tablet Take 1,000 mg by mouth 2 (two) times daily.    NOVOLOG FLEXPEN U-100 INSULIN 100 unit/mL (3 mL) InPn pen Inject 5 Units into the  "skin 3 (three) times daily before meals. Plus sliding scale if needed. Total daily dose of 30 units     Past failed treatment include:  V-go caused a skin reaction.  Tresiba and Fiasp switched to current insulins due to formulary with insurance switch.     Blood glucose testing continuous per Dexcom G6 // Declines upgrade to G7  Preferred lab: [facility]     Any episodes of hypoglycemia? Denies    Complications related to diabetes: peripheral neuropathy and cardiovascular disease    Her blood sugar in the clinic today was:   Lab Results   Component Value Date    POCGLU 93 08/23/2023     Gina Schroeder presents today for follow up visit to discuss diabetes management.     Now off Jardiance per PCP.  Off 2-3 months months but feels more bloated. Wants to re-start a half tablet.    I do not have her Dexcom readings for this visit, but she states glycemic control is stable. Tolerating Trulicity well without GI side effects.     Peripheral neuropathy- Better on Lyrica, previously on Gabapentin that did not control and also caused significant fatigue.     Hypothyroidism-she takes Synthroid 125 mg daily. Thyroid function studies are normal.      Hyperlipidemia- On Crestor and Vascepa.    CAD- H/o cardiac stents placed. Followed by cardiology routinely.     Lab Results   Component Value Date    HGBA1C 6.0 (H) 07/10/2024    HGBA1C 6.4 04/24/2024    HGBA1C 6.8 (H) 02/07/2024     STANDARDS OF CARE  Diabetes Management Status    Statin: Taking  ACE/ARB: Taking    Screening or Prevention Patient's value Goal Complete/Controlled?   HgA1C Testing and Control   Lab Results   Component Value Date    HGBA1C 6.0 (H) 07/10/2024      Annually/Less than 8% Yes   Lipid profile : 07/10/2024 Annually No   LDL control Lab Results   Component Value Date    LDLCALC 27 07/10/2024    Annually/Less than 100 mg/dl  Yes   Nephropathy screening No results found for: "LABMICR"  No results found for: "PROTEINUA"  No results found for: "UTPCR"   Annually No "   Blood pressure BP Readings from Last 1 Encounters:   08/23/23 115/73    Less than 140/90 Yes   Dilated retinal exam : 09/23/2022 Annually No   Foot exam   : 06/29/2022 Annually No Deferred-virtual visit      Labs reviewed and are noted below.    Lab Results   Component Value Date    CHOL 106 07/10/2024    TRIG 302 (H) 07/10/2024    HDL 35 (L) 07/10/2024    LDLCALC 27 07/10/2024     05/10/2024    K 3.6 05/10/2024     05/10/2024    ANIONGAP 6 (L) 05/10/2024    CREATININE 1.06 05/10/2024    ESTGFRAFRICA 59 05/10/2024    EGFRNONAA >60.0 11/17/2021    BUN 15 05/10/2024    CO2 27 05/10/2024    TSH 0.882 07/10/2024    GLU 74 11/17/2021     Lab Results   Component Value Date    GLUTAMICACID 0.00 11/17/2021    CPEPTIDE 0.37 (L) 11/17/2021    TSH 0.882 07/10/2024    CALCIUM 9.6 05/10/2024     Lab Results   Component Value Date    CPEPTIDE 0.37 (L) 11/17/2021     Lab Results   Component Value Date    GLUTAMICACID 0.00 11/17/2021     Glucose   Date Value Ref Range Status   11/17/2021 74 70 - 110 mg/dL Final     Anion Gap   Date Value Ref Range Status   05/10/2024 6 (L) 8 - 16 mmol/L Final   11/17/2021 8 8 - 16 mmol/L Final     eGFR if    Date Value Ref Range Status   11/17/2021 >60.0 >60 mL/min/1.73 m^2 Final     eGFR    Date Value Ref Range Status   05/10/2024 59 mL/min/1.73mSq Final     Comment:     In accordance with NKF-ASN Task Force recommendation, calculation based on the Chronic Kidney Disease Epidemiology Collaboration (CKD-EPI) equation without adjustment for race. eGFR adjusted for gender and age and calculated in ml/min/1.73mSquared. eGFR cannot be calculated if patient is under 18 years of age.     Reference Range:   >= 60 ml/min/1.73mSquared.     eGFR if non    Date Value Ref Range Status   11/17/2021 >60.0 >60 mL/min/1.73 m^2 Final     Comment:     Calculation used to obtain the estimated glomerular filtration  rate (eGFR) is the CKD-EPI equation.           The following portions of the patient's history were reviewed and updated as appropriate: allergies, current medications, past family history, past medical history, past social history, past surgical history and problem list.    Review of patient's allergies indicates:  No Known Allergies  Social History     Socioeconomic History    Marital status:    Tobacco Use    Smoking status: Never    Smokeless tobacco: Never   Substance and Sexual Activity    Alcohol use: Not Currently    Drug use: Not Currently    Sexual activity: Yes     Partners: Male     Social Determinants of Health     Financial Resource Strain: Low Risk  (9/14/2023)    [facility], [facility]    Overall Financial Resource Strain (CARDIA)     Difficulty of Paying Living Expenses: Not hard at all   Food Insecurity: No Food Insecurity (7/1/2024)    [facility]    Hunger Vital Sign     Worried About Running Out of Food in the Last Year: Never true     Ran Out of Food in the Last Year: Never true   Transportation Needs: No Transportation Needs (7/1/2024)    [facility]    PRAPARE - Transportation     Lack of Transportation (Medical): No     Lack of Transportation (Non-Medical): No   Physical Activity: Inactive (9/14/2023)    [facility], [facility] and Its [facility]    Exercise Vital Sign     Days of Exercise per Week: 0 days     Minutes of Exercise per Session: 0 min   Stress: No Stress Concern Present (7/1/2024)    Received from [facility]  [facility] Subsidiaries and Affiliates    [employer] - Occupational Stress Questionnaire     Feeling of Stress : Not at all   Housing Stability: Unknown (9/14/2023)    [facility], [facility] and Affiliates    Housing Stability Vital Sign     Unable to Pay for Housing in the Last Year: No     In the last 12 months, was there a time when you did not have a steady place to sleep or slept in a shelter (including now)?: No     Past Medical History:   Diagnosis Date    Arthritis     Diabetes mellitus     Heart disease      REVIEW OF SYSTEMS:  Eyes No history of DR.  Cardiovascular: History of HLD and CAD  GI: Tolerating Trulicity well without GI side effects.   : No CKD.  Neuro: Positive neuropathy.  PSYCH: No tobacco use.  ENDO: See HPI.        Objective:      There were no vitals filed for this visit.  RESPIRATORY: No respiratory distress  NEUROLOGIC: not assessed-virtual visit  PSYCHIATRIC: Alert & oriented x3. Normal mood and affect.  FOOT EXAMINATION: Deferred-virtual visit  Assessment:       1. Controlled type 2 diabetes mellitus with hypoglycemia, with long-term current use of insulin    2. Diabetic peripheral neuropathy associated with type 2 diabetes mellitus    3. Hypothyroidism due to Hashimoto's thyroiditis    4. Coronary artery disease, unspecified vessel or lesion type, unspecified whether angina present, unspecified whether native or transplanted heart    5. Hyperlipidemia associated with type 2 diabetes mellitus          Plan:   Gina was seen today for diabetes mellitus.    Diagnoses and all orders for this visit:    Controlled type 2 diabetes mellitus with hypoglycemia, with long-term current use of insulin  -     LANTUS SOLOSTAR U-100 INSULIN 100 unit/mL (3 mL) InPn pen; Inject 40 Units into the skin once daily.  -  "    blood-glucose transmitter (DEXCOM G6 TRANSMITTER) Reyna; Change transmitter every 90 days  -     blood-glucose sensor (DEXCOM G6 SENSOR) Reyna; 1 each by Misc.(Non-Drug; Combo Route) route every 10 days.    Chronic -       Re-start Jardiance 1/2 tablet daily.     Continue Trulicity to 1.5 mg weekly.     Re-start Lantus 40 units every morning.    Continue Novolog 5 units three times a day before each main meal, plus sliding scale if needed.     160-200: +1 unit  201-240: +2 units  241-280: +3 units  281-320: +4 units  321-360: +5 units      Diabetic peripheral neuropathy associated with type 2 diabetes mellitus    Hypothyroidism due to Hashimoto's thyroiditis    Coronary artery disease, unspecified vessel or lesion type, unspecified whether angina present, unspecified whether native or transplanted heart    Hyperlipidemia associated with type 2 diabetes mellitus      - Follow up:  6 months    Portions of this note may have been created with voice recognition software. Occasional "wrong-word" or "sound-a-like" substitutions may have occurred due to the inherent limitations of voice recognition software. Please, read the note carefully and recognize, using context, where substitutions have occurred.           Preethi St,DEBC, BC-ADM  [facility]  "

## 2024-08-14 NOTE — PATIENT INSTRUCTIONS
1. Limit carbs to no more than 30 grams with each meal. Never eat carbs by themselves, always add protein. Make snacks low carb or non-carb only.    2. Continue checking blood sugar 4 times daily: Blood sugar goals should be a fasting blood sugar between , and no blood sugars throughout the day over 180 is good, less than 160 better. Keep a log book and bring with you to all appointments along with your glucose meter.    3. Medications adjusted for today's visit include:    Continue Metformin twice a day.    Re-start Jardiance 1/2 tablet daily.     Continue Trulicity to 1.5 mg weekly.     Re-start Lantus 40 units every morning.    Continue Novolog 5 units three times a day before each main meal, plus sliding scale if needed.     160-200: +1 unit  201-240: +2 units  241-280: +3 units  281-320: +4 units  321-360: +5 units    4. Carry glucose tablets with you at all times to treat a low blood sugar episode (less than 70). Chew 4 tablets and re-check blood sugar in 15 minutes. If still low, repeat this. Always call the clinic to give an update for any low blood sugar episodes.    5. Exercise as tolerated.    6. Follow-up for your next visit in 6 months.    7. Please either drop off, fax, or MyChart your readings to me as needed.

## 2024-10-18 DIAGNOSIS — Z79.4 CONTROLLED TYPE 2 DIABETES MELLITUS WITH HYPOGLYCEMIA, WITH LONG-TERM CURRENT USE OF INSULIN: ICD-10-CM

## 2024-10-18 DIAGNOSIS — E11.649 CONTROLLED TYPE 2 DIABETES MELLITUS WITH HYPOGLYCEMIA, WITH LONG-TERM CURRENT USE OF INSULIN: ICD-10-CM

## 2024-10-18 RX ORDER — INSULIN ASPART 100 [IU]/ML
5 INJECTION, SOLUTION INTRAVENOUS; SUBCUTANEOUS
Qty: 15 ML | Refills: 5 | Status: SHIPPED | OUTPATIENT
Start: 2024-10-18

## 2024-10-18 NOTE — TELEPHONE ENCOUNTER
Discharge Planner OT   Patient plan for discharge: Home with assist. Agreeable to rehab  Current status: Pt went from supine to sit EOB with SBA. Pt went from sit<>stand with moderate assist and verbal cues for hand placement. Pt easily fatigued during standing. While seated EOB pt completed 2 grooming and hygiene tasks with SBA. Educated on walker safety   Barriers to return to prior living situation: balance, low activity tolerance, strength  Recommendations for discharge: ARU   Rationale for recommendations: ARU to increase I and safety with ADL/IADLs before returning home.       Entered by: Veena Sims 12/08/2017 8:31 AM            ----- Message from Dayana sent at 10/18/2024  9:58 AM CDT -----  Contact: pt  Type:  RX Refill Request    Who Called:  pt  Refill or New Rx: refill  RX Name and Strength: NOVOLOG FLEXPEN U-100 INSULIN 100 unit/mL (3 mL) InPn pen  How is the patient currently taking it? (ex. 1XDay):  Is this a 30 day or 90 day RX:  Preferred Pharmacy with phone number: [phone]  Local or Mail Order: local  Ordering Provider: [email or web address]  Would the patient rather a call back or a response via [facility]?   Best Call Back Number:  Additional Information:       [facility] [place] [facility] [address] [place] [address]  Phone: [phone] Fax: [phone]

## 2024-11-04 ENCOUNTER — TELEPHONE (OUTPATIENT)
Dept: SPORTS MEDICINE | Facility: CLINIC | Age: 64
End: 2024-11-04
Payer: MEDICAID

## 2024-11-07 ENCOUNTER — TELEPHONE (OUTPATIENT)
Dept: SPORTS MEDICINE | Facility: CLINIC | Age: 64
End: 2024-11-07
Payer: MEDICAID

## 2024-11-07 NOTE — TELEPHONE ENCOUNTER
----- Message from Med Assistant Alejandro sent at 11/4/2024 11:35 AM CST -----  Contact: patient  Please reach out to schedule  ----- Message -----  From: Negar Porras  Sent: 11/4/2024  11:17 AM CST  To: [facility] Clinical Staff    Type:  Sooner Apoointment Request    Caller is requesting a sooner appointment.  Caller declined first available appointment listed below.  Caller will not accept being placed on the waitlist and is requesting a message be sent to doctor.  Name of Caller:Gina Schroeder   When is the first available appointment? N/a   Symptoms: Bilateral arm pain   Would the patient rather a call back or a response via [facility]?  Call   Best Call Back Number: [phone]   Additional Information:  pt was previously being seen by Dr.Jeffery Purcell.

## 2024-11-12 ENCOUNTER — TELEPHONE (OUTPATIENT)
Dept: DIABETES | Facility: CLINIC | Age: 64
End: 2024-11-12
Payer: MEDICAID

## 2024-11-12 DIAGNOSIS — E11.649 CONTROLLED TYPE 2 DIABETES MELLITUS WITH HYPOGLYCEMIA, WITH LONG-TERM CURRENT USE OF INSULIN: Primary | ICD-10-CM

## 2024-11-12 DIAGNOSIS — Z79.4 CONTROLLED TYPE 2 DIABETES MELLITUS WITH HYPOGLYCEMIA, WITH LONG-TERM CURRENT USE OF INSULIN: Primary | ICD-10-CM

## 2024-11-12 NOTE — TELEPHONE ENCOUNTER
----- Message from Abdias sent at 11/12/2024 11:15 AM CST -----  Regarding: Refill  Contact: Pt [phone]  Requesting an RX refill or new RX.    Is this a refill or new RX: Refill (Not found in chart)    RX name and strength (copy/paste from chart):  jardiance (New rx?)     Is this a 30 day or 90 day RX:     Pharmacy name and phone # (copy/paste from chart):      [facility] [place] [address] [address]  Phone: [phone] Fax: [phone]       Comments: Call for prior autho